# Patient Record
Sex: FEMALE | Race: WHITE | NOT HISPANIC OR LATINO | Employment: OTHER | URBAN - METROPOLITAN AREA
[De-identification: names, ages, dates, MRNs, and addresses within clinical notes are randomized per-mention and may not be internally consistent; named-entity substitution may affect disease eponyms.]

---

## 2017-02-22 ENCOUNTER — TRANSCRIBE ORDERS (OUTPATIENT)
Dept: ADMINISTRATIVE | Facility: HOSPITAL | Age: 62
End: 2017-02-22

## 2017-07-10 ENCOUNTER — GENERIC CONVERSION - ENCOUNTER (OUTPATIENT)
Dept: OTHER | Facility: OTHER | Age: 62
End: 2017-07-10

## 2017-07-10 ENCOUNTER — TRANSCRIBE ORDERS (OUTPATIENT)
Dept: SLEEP CENTER | Facility: CLINIC | Age: 62
End: 2017-07-10

## 2017-07-10 DIAGNOSIS — R06.83 SNORING: Primary | ICD-10-CM

## 2017-07-10 DIAGNOSIS — G47.33 OBSTRUCTIVE SLEEP APNEA (ADULT) (PEDIATRIC): Primary | ICD-10-CM

## 2017-07-12 ENCOUNTER — GENERIC CONVERSION - ENCOUNTER (OUTPATIENT)
Dept: OTHER | Facility: OTHER | Age: 62
End: 2017-07-12
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
## 2018-01-10 NOTE — PROGRESS NOTES
Message  clearance forms prepared and faxed to patient's psychiatrist       Active Problems    1  Acid reflux (530 81) (K21 9)   2  Acute Erosive Gastritis (535 40)   3  Acute otitis media, unspecified laterality   4  Acute sinusitis (461 9) (J01 90)   5  Allergic rhinitis (477 9) (J30 9)   6  Anisocoria (379 41) (H57 02)   7  Asthma (493 90) (J45 909)   8  Bipolar Disorder   9  Breast pain (611 71) (N64 4)   10  Cough (786 2) (R05)   11  Cyst of ovary, right (620 2) (N83 201)   12  Cystocele, midline (618 01) (N81 11)   13  Depression (311) (F32 9)   14  Diaper rash (691 0) (L22)   15  Encounter for screening mammogram for malignant neoplasm of breast (V76 12)    (Z12 31)   16  Esophagitis, reflux (530 11) (K21 0)   17  Essential hypertriglyceridemia (272 1) (E78 1)   18  Fatigue (780 79) (R53 83)   19  Headache (784 0) (R51)   20  Heart burn (787 1) (R12)   21  Hyperlipidemia (272 4) (E78 5)   22  Hyponatremia (276 1) (E87 1)   23  Hypothyroidism (244 9) (E03 9)   24  Lump or mass in breast (611 72) (N63)   25  Migraine headache (346 90) (G43 909)   26  Morbid obesity (278 01) (E66 01)   27  Narcolepsy (347 00) (G47 419)   28  Need for pneumococcal vaccination (V03 82) (Z23)   29  Need for prophylactic vaccination and inoculation against influenza (V04 81) (Z23)   30  Overactive bladder (596 51) (N32 81)   31  Pessary maintenance (V53 99) (Z46 89)   32  Post traumatic stress disorder (309 81) (F43 10)   33  Skin rash (782 1) (R21)   34  Tobacco use (305 1) (Z72 0)   35  Urge incontinence of urine (788 31) (N39 41)   36  Urinary tract infection (599 0) (N39 0)   37  Vaginal atrophy (627 3) (N95 2)   38  Vaginal Pain (625 8)   39  Vitamin D deficiency (268 9) (E55 9)   40  Well adult on routine health check (V70 0) (Z00 00)    Current Meds   1  Abilify 15 MG Oral Tablet (ARIPiprazole); TAKE 1 TABLET DAILY; Therapy: 04KBT1255 to (Susi Ojeda)  Requested for: 34FGP7380; Last   Rx:24Oct2013 Ordered   2  AeroChamber MV Miscellaneous; Use with ventolin; Therapy: 87CWT0147 to (Evaluate:82Hrp4672); Last Rx:27Pba8687 Ordered   3  Aspirin 81 MG CAPS; Therapy: (Recorded:67Nlr0016) to Recorded   4  BreatheRite Melba Spacer Adult Miscellaneous; Use as directed with the inhalers; Therapy: 14UOJ3052 to (Last Rx:72Gfq0544)  Requested for: 21Rts5943 Ordered   5  BuPROPion HCl ER (XL) 150 MG Oral Tablet Extended Release 24 Hour; Therapy: 26FXS7293 to Recorded   6  CarBAMazepine 200 MG Oral Tablet; Therapy: 32Shl6682 to (Last Rx:10Apr2011)  Requested for: 10Apr2011 Ordered   7  CloNIDine HCl - 0 1 MG Oral Tablet; Therapy: 99BPD5953 to Recorded   8  Fish Oil Triple Strength 1400 MG Oral Capsule; TAKE 2 CAPSULE Every twelve hours; Therapy: 76EMO9989 to (Evaluate:12Nov2014); Last Rx:17Nov2013 Ordered   9  Geodon 60 MG Oral Capsule (Ziprasidone HCl); Therapy: 06CHF5743 to (Last Rx:20Jan2012)  Requested for: 20Jan2012 Ordered   10  Levothyroxine Sodium 25 MCG Oral Tablet; take 1 tablet by mouth once daily; Therapy: 50UNI2870 to (Maryana Espinoza)  Requested for: 41QMH0294; Last    Rx:09Mar2015 Ordered   11  LORazepam 2 MG Oral Tablet; Therapy: 14EJW5760 to Recorded   12  Montelukast Sodium 10 MG Oral Tablet (Singulair); TAKE 1 TABLET AT BEDTIME; Therapy: 77MYJ3305 to (Maryana Espinoza)  Requested for: 34DVY8130; Last    Rx:26Mar2015; Status: ACTIVE - Renewal Denied Ordered   13  Omeprazole 20 MG Oral Capsule Delayed Release; TAKE 1 CAPSULE AT BEDTIME; Therapy: 62OWR9486 to (Evaluate:07Jun2015)  Requested for: 96HJI5616; Last    Rx:80Lle4928 Ordered   14  Oxybutynin Chloride 5 MG Oral Tablet; Therapy: (Recorded:47Qqn3956) to Recorded   15  Premarin 0 625 MG/GM Vaginal Cream; Apply pea sized amount M, W, F for 3 weeks and    then 2 times a week for the rest of the time; Therapy: 08Edy4828 to (Last Rx:28Aug2014) Ordered   16  TraZODone HCl - 150 MG Oral Tablet;     Therapy: 32XJM5242 to (Last Rx:03Xxx4905)  Requested for: 13Sok1097 Ordered   17  TraZODone HCl - 300 MG Oral Tablet; Therapy: 38RQD2517 to Recorded   18  Ventolin  (90 Base) MCG/ACT Inhalation Aerosol Solution; INHALE 2 PUFFS    EVERY 6 HOURS AS NEEDED; Therapy: 91FRO7128 to (Evaluate:25Mar2016)  Requested for: 95TXW4927; Last    Rx:26Mar2015 Ordered   19  Vitamin D 2000 UNIT Oral Capsule; TAKE 2 CAPSULE Daily; Therapy: 79FFH2438 to (Evaluate:80Szm9685)  Requested for: 33TUT6123; Last    Rx:12Nov2013 Ordered   20  Ziprasidone HCl - 80 MG Oral Capsule; Therapy: 98KTZ1282 to (Last Vickii Budds)  Requested for: 60Eih0561 Ordered    Allergies    1  Cymbalta CPEP   2  Lithium Carbonate CAPS    Signatures   Electronically signed by :  Kj Wright, LCSWMSWMSW,FLAVIO; Jul 12 2017 12:23PM EST                       (Author)

## 2018-01-13 VITALS
RESPIRATION RATE: 22 BRPM | TEMPERATURE: 98.4 F | HEIGHT: 63 IN | HEART RATE: 80 BPM | WEIGHT: 226 LBS | DIASTOLIC BLOOD PRESSURE: 64 MMHG | SYSTOLIC BLOOD PRESSURE: 122 MMHG | BODY MASS INDEX: 40.04 KG/M2

## 2018-01-13 VITALS — WEIGHT: 226 LBS | BODY MASS INDEX: 40.04 KG/M2 | HEIGHT: 63 IN

## 2018-01-13 NOTE — PROGRESS NOTES
History of Present Illness  Bariatric MNT [facility] Screening Preop [facility]:     She was not on time she was late by 30 minutes  the appointment lasted: 30 minutes  Her surgeon is Dr Darrian Santos  The patient was present at the session  The diagnosis/reason for the appointment is: She has Grade III Obesity with a BMI of 40 7  Diet Order: Nutritional Assesment for Bariatric Surgery  Her goal weight is 165#-174#  She has the following comorbidities: hypertension and Asthma, Hypothyroid, Heartburn, Reflux, Depression, Anxiety, PTSD, lb, hysty  Labs: Girma Richey She was reminded to have her labs drawn   She does not need to monitor her glucose  She does not have a meter to test her blood glucose  Exercise Frequency:  She does not exercise  Relationship to food: She is an emotional eater, eats when she is bored and eats large portions  She knows the difference between being comfortably full and uncomfortably full and knows the difference between being stuffed and comfortably full  She felt/thought they felt her best at 115-120# pounds she last weighed this 23years old  She completed a food journal She drinks 0 cups of water daily She drinks 10-20 caffienated beverages daily Her motivators are:pt wants to improve health and quality of life  Her obesity/being overweight is related to positive energy balance and is evidenced by: BMI=40 7, pt repots large portions and sedentary lifestyle  Knowledge Deficit Prior to Education  She is new to the diet  Barriers to education:  She has no barriers to education  Medical Nutrition Therapy Intervention: Individualized Meal Plan, Daily Food /Exercise Diary, Lifestyle /Behavior Modification Techniques, Basic Pathophysiology of Obesity, Checklist of the Overview of Lesson Plans and Surgical changes to Stomach/GI     Area's Reviewed: Post - surgery goal weight, Web forum, Lifestyle Misti Ledezma Modification Techniques, [employer] in [place] [employer] ( hand out for on-line & smart phone) and Pre- surgery goals /rules  Brief Review of Vitamins, diet progression for post-op and Pathophysiology of Obesity  Her comprehension of the presented material was fair  Her receptivity to the presented material was fair  Her motivation was fair  Provided: Nutrition Guidelines for Gastric Surgery, Bariatric Program Pre- Surgery Nutrition and Goals  Goals: Her set goal for physical activity is walk , for 30 minutes, 3-5 days a week  Review [employer] in [place]   Post Surgery: She will adhere to the diet progression: remain hydrated, consume adequate protein; and take vitamins as outlined in guidelines  Patient is a 63year old who is here for nutritional evaluation for weight loss surgery  I reviewed co-morbidities and medications with patient  She first recalls having problems with weight gain as a young adult  She has dieted in the past with variable success but would regain the weight back  For her personal pre-op goals she will: begin food journaling, measuring her food portions, eating three meals per day, and walking around her neighborhood 5 days per week  She plans to food journal to track her intake  Provided pt with information on [employer]  She was instructed on the importance of consistent vitamin and mineral intake after her surgery to prevent deficiencies  She currently takes 2000 IU vitamin D3 and an omega-3 supplement daily  Reviewed importance of support after surgery and discussed the [employer], facebook page, pep rally, and support group  Patient states adequate knowledge of nutrition, exercise, and behavior modification required for long term success  Pt  is recommended for surgery and is aware to practice lifestyle modification, complete all six lesson plans in bariatric manual, and complete two-week liver shrinking diet prior to surgery   Patient is aware that she has 3 months of a medically-supervised weight management program required by her insurance  Month 1 of 6 scheduled with TODD+GABRIELLE for Thursday, 8/10/17 at 11:30am    Recommendations: She was provided contact information for any questions  She is recommended for surgery  ~He/She needs additional education  She states adequate knowledge of nutrition, exercise, and behavior modification  She will attend a Team Meeting approximately 2-3 weeks prior to surgery  Active Problems    1  Acid reflux (530 81) (K21 9)   2  Acute Erosive Gastritis (535 40)   3  Acute otitis media, unspecified laterality   4  Acute sinusitis (461 9) (J01 90)   5  Allergic rhinitis (477 9) (J30 9)   6  Anisocoria (379 41) (H57 02)   7  Asthma (493 90) (J45 909)   8  Bipolar Disorder   9  Breast pain (611 71) (N64 4)   10  Cough (786 2) (R05)   11  Cyst of ovary, right (620 2) (N83 201)   12  Cystocele, midline (618 01) (N81 11)   13  Depression (311) (F32 9)   14  Diaper rash (691 0) (L22)   15  Encounter for screening mammogram for malignant neoplasm of breast (V76 12)    (Z12 31)   16  Esophagitis, reflux (530 11) (K21 0)   17  Essential hypertriglyceridemia (272 1) (E78 1)   18  Fatigue (780 79) (R53 83)   19  Headache (784 0) (R51)   20  Heart burn (787 1) (R12)   21  Hyperlipidemia (272 4) (E78 5)   22  Hyponatremia (276 1) (E87 1)   23  Hypothyroidism (244 9) (E03 9)   24  Lump or mass in breast (611 72) (N63)   25  Migraine headache (346 90) (G43 909)   26  Morbid obesity (278 01) (E66 01)   27  Narcolepsy (347 00) (G47 419)   28  Need for pneumococcal vaccination (V03 82) (Z23)   29  Need for prophylactic vaccination and inoculation against influenza (V04 81) (Z23)   30  Overactive bladder (596 51) (N32 81)   31  Pessary maintenance (V53 99) (Z46 89)   32  Post traumatic stress disorder (309 81) (F43 10)   33  Skin rash (782 1) (R21)   34  Tobacco use (305 1) (Z72 0)   35  Urge incontinence of urine (788 31) (N39 41)   36  Urinary tract infection (599 0) (N39 0)   37   Vaginal atrophy (627 3) (N95 2) 38  Vaginal Pain (625 8)   39  Vitamin D deficiency (268 9) (E55 9)   40  Well adult on routine health check (V70 0) (Z00 00)    Past Medical History    1  Bipolar Disorder   2  Cough (786 2) (R05)   3  History of transient cerebral ischemia (V12 54) (Z86 73)   4  History of upper respiratory infection (V12 09) (Z87 09)   5  Need for pneumococcal vaccination (V03 82) (Z23)   6  Need for prophylactic vaccination and inoculation against influenza (V04 81) (Z23)    Surgical History    1  History of Cholecystectomy   2  History of Hysterectomy   3  History of Tubal Ligation    Family History  Mother    1  Family history of cerebrovascular accident (CVA) (V17 1) (Z82 3)   2  Family history of diabetes mellitus (V18 0) (Z83 3)   3  Family history of hypertension (V17 49) (Z82 49)  Father    4  Family history of    5  Family history of malignant neoplasm (V16 9) (Z80 9)    Social History    · Does not use illicit drugs (I24 83) (Z78 9)   · Recently quit tobacco use   · Social alcohol use (Z78 9)   · Tobacco use (305 1) (Z72 0)    Current Meds   1  Abilify 15 MG Oral Tablet; TAKE 1 TABLET DAILY; Therapy: 16GON4639 to (Mirlande Christie)  Requested for: 29MLY6040; Last   Rx:31Yfq3537 Ordered   2  AeroChamber MV Miscellaneous; Use with ventolin; Therapy: 17BMJ4184 to (Evaluate:52Emq9123); Last Rx:64Vlp2467 Ordered   3  Aspirin 81 MG CAPS; Therapy: (Recorded:53Yey7264) to Recorded   4  BreatheRite Melba Spacer Adult Miscellaneous; Use as directed with the inhalers; Therapy: 98THY0550 to (Last Rx:57Idq7225)  Requested for: 70Wmh6454 Ordered   5  BuPROPion HCl ER (XL) 150 MG Oral Tablet Extended Release 24 Hour; Therapy: 27POM0866 to Recorded   6  CarBAMazepine 200 MG Oral Tablet; Therapy: 03Ngk6236 to (Last Rx:26Nao3844)  Requested for: 24Lnz8288 Ordered   7  CloNIDine HCl - 0 1 MG Oral Tablet; Therapy: 57NMQ4155 to Recorded   8   Fish Oil Triple Strength 1400 MG Oral Capsule; TAKE 2 CAPSULE Every twelve hours; Therapy: 49GQW8584 to (Evaluate:12Nov2014); Last Rx:17Nov2013 Ordered   9  Geodon 60 MG Oral Capsule; Therapy: 95NKT8741 to (Last Rx:20Jan2012)  Requested for: 20Jan2012 Ordered   10  Levothyroxine Sodium 25 MCG Oral Tablet; take 1 tablet by mouth once daily; Therapy: 07MZH8272 to (Reji Mechellenancy)  Requested for: 48QIV4397; Last    Rx:09Mar2015 Ordered   11  LORazepam 2 MG Oral Tablet; Therapy: 66MFT0461 to Recorded   12  Montelukast Sodium 10 MG Oral Tablet; TAKE 1 TABLET AT BEDTIME; Therapy: 92ERR1942 to (Lito Holcomb)  Requested for: 12XXC1959; Last    Rx:26Mar2015; Status: ACTIVE - Renewal Denied Ordered   13  Omeprazole 20 MG Oral Capsule Delayed Release; TAKE 1 CAPSULE AT BEDTIME; Therapy: 90LWS8161 to (Evaluate:07Jun2015)  Requested for: 36RRQ7520; Last    Rx:95Ovi1434 Ordered   14  Oxybutynin Chloride 5 MG Oral Tablet; Therapy: (Recorded:01Fxq2159) to Recorded   15  Premarin 0 625 MG/GM Vaginal Cream; Apply pea sized amount M, W, F for 3 weeks and    then 2 times a week for the rest of the time; Therapy: 35Mxo5109 to (Last Rx:29Bpw6566) Ordered   16  TraZODone HCl - 150 MG Oral Tablet; Therapy: 76GNK3098 to (Last Rx:20Mef3479)  Requested for: 39Kpj3047 Ordered   17  TraZODone HCl - 300 MG Oral Tablet; Therapy: 18IGP5421 to Recorded   18  Ventolin  (90 Base) MCG/ACT Inhalation Aerosol Solution; INHALE 2 PUFFS    EVERY 6 HOURS AS NEEDED; Therapy: 94WYA5660 to (Evaluate:25Mar2016)  Requested for: 21AYW2915; Last    Rx:26Mar2015 Ordered   19  Vitamin D 2000 UNIT Oral Capsule; TAKE 2 CAPSULE Daily; Therapy: 90XIP7013 to (Evaluate:09Xic5431)  Requested for: 95SPL6148; Last    Rx:12Nov2013 Ordered   20  Ziprasidone HCl - 80 MG Oral Capsule; Therapy: 75XVH9570 to (Last Rozetta Plain)  Requested for: 38Mbr8903 Ordered    Allergies    1  Cymbalta CPEP   2   Lithium Carbonate CAPS    Vitals  Signs   Recorded: 70LEZ4896 03:59PM   Height: 5 ft 2 5 in  Weight: 226 lb   BMI Calculated: 40 68  BSA Calculated: 2 03    Future Appointments    Date/Time Provider Specialty Site   10/20/2017 01:00 PM SHIMA Wilson  Bariatric Medicine [facility]     Signatures   Electronically signed by : PETRA June RD; Jul 10 2017  3:59PM EST                       (Author)    Electronically signed by :  SHIMA Rowe ; Jul 11 2017  9:16AM EST

## 2020-10-19 ENCOUNTER — HOSPITAL ENCOUNTER (INPATIENT)
Facility: HOSPITAL | Age: 65
LOS: 10 days | Discharge: HOME | DRG: 885 | End: 2020-10-30
Attending: EMERGENCY MEDICINE | Admitting: PSYCHIATRY & NEUROLOGY
Payer: COMMERCIAL

## 2020-10-19 DIAGNOSIS — E03.9 ACQUIRED HYPOTHYROIDISM: ICD-10-CM

## 2020-10-19 DIAGNOSIS — F32.A DEPRESSION: Primary | ICD-10-CM

## 2020-10-19 DIAGNOSIS — Z79.899 ENCOUNTER FOR MEDICATION REVIEW: ICD-10-CM

## 2020-10-19 DIAGNOSIS — F31.9 BIPOLAR 1 DISORDER (HCC): ICD-10-CM

## 2020-10-19 DIAGNOSIS — Z79.899 MEDICATION MANAGEMENT: ICD-10-CM

## 2020-10-19 LAB
ALBUMIN SERPL BCP-MCNC: 3.7 G/DL (ref 3–5.2)
ALP SERPL-CCNC: 61 U/L (ref 43–122)
ALT SERPL W P-5'-P-CCNC: 11 U/L (ref 9–52)
AMPHETAMINES SERPL QL SCN: NEGATIVE
ANION GAP SERPL CALCULATED.3IONS-SCNC: 7 MMOL/L (ref 5–14)
AST SERPL W P-5'-P-CCNC: 18 U/L (ref 14–36)
BACTERIA UR QL AUTO: ABNORMAL /HPF
BARBITURATES UR QL: NEGATIVE
BASOPHILS # BLD AUTO: 0 THOUSANDS/ΜL (ref 0–0.1)
BASOPHILS NFR BLD AUTO: 1 % (ref 0–1)
BENZODIAZ UR QL: NEGATIVE
BILIRUB SERPL-MCNC: 0.4 MG/DL
BILIRUB UR QL STRIP: NEGATIVE
BUN SERPL-MCNC: 18 MG/DL (ref 5–25)
CALCIUM SERPL-MCNC: 9.1 MG/DL (ref 8.4–10.2)
CHLORIDE SERPL-SCNC: 98 MMOL/L (ref 97–108)
CLARITY UR: ABNORMAL
CO2 SERPL-SCNC: 27 MMOL/L (ref 22–30)
COCAINE UR QL: NEGATIVE
COLOR UR: YELLOW
CREAT SERPL-MCNC: 0.67 MG/DL (ref 0.6–1.2)
EOSINOPHIL # BLD AUTO: 0.1 THOUSAND/ΜL (ref 0–0.4)
EOSINOPHIL NFR BLD AUTO: 1 % (ref 0–6)
ERYTHROCYTE [DISTWIDTH] IN BLOOD BY AUTOMATED COUNT: 18.6 %
ETHANOL EXG-MCNC: 0 MG/DL
GFR SERPL CREATININE-BSD FRML MDRD: 94 ML/MIN/1.73SQ M
GLUCOSE SERPL-MCNC: 97 MG/DL (ref 70–99)
GLUCOSE UR STRIP-MCNC: NEGATIVE MG/DL
HCT VFR BLD AUTO: 36.5 % (ref 36–46)
HGB BLD-MCNC: 12.4 G/DL (ref 12–16)
HGB UR QL STRIP.AUTO: 10
KETONES UR STRIP-MCNC: NEGATIVE MG/DL
LEUKOCYTE ESTERASE UR QL STRIP: 500
LYMPHOCYTES # BLD AUTO: 1.6 THOUSANDS/ΜL (ref 0.5–4)
LYMPHOCYTES NFR BLD AUTO: 25 % (ref 25–45)
MCH RBC QN AUTO: 27.6 PG (ref 26–34)
MCHC RBC AUTO-ENTMCNC: 34 G/DL (ref 31–36)
MCV RBC AUTO: 81 FL (ref 80–100)
METHADONE UR QL: NEGATIVE
MONOCYTES # BLD AUTO: 0.6 THOUSAND/ΜL (ref 0.2–0.9)
MONOCYTES NFR BLD AUTO: 9 % (ref 1–10)
NEUTROPHILS # BLD AUTO: 4.2 THOUSANDS/ΜL (ref 1.8–7.8)
NEUTS SEG NFR BLD AUTO: 64 % (ref 45–65)
NITRITE UR QL STRIP: NEGATIVE
NON-SQ EPI CELLS URNS QL MICRO: ABNORMAL /HPF
OPIATES UR QL SCN: NEGATIVE
OXYCODONE+OXYMORPHONE UR QL SCN: NEGATIVE
PCP UR QL: NEGATIVE
PH UR STRIP.AUTO: 7 [PH]
PLATELET # BLD AUTO: 336 THOUSANDS/UL (ref 150–450)
PMV BLD AUTO: 7.9 FL (ref 8.9–12.7)
POTASSIUM SERPL-SCNC: 3.7 MMOL/L (ref 3.6–5)
PROT SERPL-MCNC: 6.8 G/DL (ref 5.9–8.4)
PROT UR STRIP-MCNC: NEGATIVE MG/DL
RBC # BLD AUTO: 4.5 MILLION/UL (ref 4–5.2)
RBC #/AREA URNS AUTO: ABNORMAL /HPF
SARS-COV-2 RNA RESP QL NAA+PROBE: NEGATIVE
SODIUM SERPL-SCNC: 132 MMOL/L (ref 137–147)
SP GR UR STRIP.AUTO: 1.01 (ref 1–1.04)
THC UR QL: NEGATIVE
TSH SERPL DL<=0.05 MIU/L-ACNC: 0.72 UIU/ML (ref 0.47–4.68)
UROBILINOGEN UA: NEGATIVE MG/DL
WBC # BLD AUTO: 6.5 THOUSAND/UL (ref 4.5–11)
WBC #/AREA URNS AUTO: ABNORMAL /HPF

## 2020-10-19 PROCEDURE — 82075 ASSAY OF BREATH ETHANOL: CPT | Performed by: EMERGENCY MEDICINE

## 2020-10-19 PROCEDURE — 80053 COMPREHEN METABOLIC PANEL: CPT | Performed by: EMERGENCY MEDICINE

## 2020-10-19 PROCEDURE — 36415 COLL VENOUS BLD VENIPUNCTURE: CPT | Performed by: EMERGENCY MEDICINE

## 2020-10-19 PROCEDURE — 99285 EMERGENCY DEPT VISIT HI MDM: CPT

## 2020-10-19 PROCEDURE — 80307 DRUG TEST PRSMV CHEM ANLYZR: CPT | Performed by: EMERGENCY MEDICINE

## 2020-10-19 PROCEDURE — 81001 URINALYSIS AUTO W/SCOPE: CPT | Performed by: EMERGENCY MEDICINE

## 2020-10-19 PROCEDURE — 93005 ELECTROCARDIOGRAM TRACING: CPT

## 2020-10-19 PROCEDURE — 85025 COMPLETE CBC W/AUTO DIFF WBC: CPT | Performed by: EMERGENCY MEDICINE

## 2020-10-19 PROCEDURE — 84443 ASSAY THYROID STIM HORMONE: CPT | Performed by: EMERGENCY MEDICINE

## 2020-10-19 PROCEDURE — 99284 EMERGENCY DEPT VISIT MOD MDM: CPT | Performed by: EMERGENCY MEDICINE

## 2020-10-19 PROCEDURE — 87635 SARS-COV-2 COVID-19 AMP PRB: CPT | Performed by: EMERGENCY MEDICINE

## 2020-10-19 RX ORDER — NITROFURANTOIN 25; 75 MG/1; MG/1
100 CAPSULE ORAL ONCE
Status: COMPLETED | OUTPATIENT
Start: 2020-10-19 | End: 2020-10-19

## 2020-10-19 RX ORDER — DIPHENHYDRAMINE HCL 25 MG
25 TABLET ORAL ONCE
Status: COMPLETED | OUTPATIENT
Start: 2020-10-19 | End: 2020-10-19

## 2020-10-19 RX ADMIN — DIPHENHYDRAMINE HCL 25 MG: 25 TABLET ORAL at 22:50

## 2020-10-19 RX ADMIN — NITROFURANTOIN (MONOHYDRATE/MACROCRYSTALS) 100 MG: 75; 25 CAPSULE ORAL at 22:50

## 2020-10-20 LAB
ATRIAL RATE: 66 BPM
P AXIS: -17 DEGREES
PR INTERVAL: 158 MS
QRS AXIS: 249 DEGREES
QRSD INTERVAL: 92 MS
QT INTERVAL: 416 MS
QTC INTERVAL: 436 MS
T WAVE AXIS: 59 DEGREES
VENTRICULAR RATE: 66 BPM

## 2020-10-20 PROCEDURE — 90682 RIV4 VACC RECOMBINANT DNA IM: CPT | Performed by: PSYCHIATRY & NEUROLOGY

## 2020-10-20 PROCEDURE — 93010 ELECTROCARDIOGRAM REPORT: CPT | Performed by: INTERNAL MEDICINE

## 2020-10-20 PROCEDURE — G0008 ADMIN INFLUENZA VIRUS VAC: HCPCS | Performed by: PSYCHIATRY & NEUROLOGY

## 2020-10-20 PROCEDURE — NC001 PR NO CHARGE: Performed by: EMERGENCY MEDICINE

## 2020-10-20 RX ORDER — TRAZODONE HYDROCHLORIDE 50 MG/1
25 TABLET ORAL
Status: DISCONTINUED | OUTPATIENT
Start: 2020-10-20 | End: 2020-10-22

## 2020-10-20 RX ORDER — LORAZEPAM 0.5 MG/1
1 TABLET ORAL ONCE
Status: COMPLETED | OUTPATIENT
Start: 2020-10-20 | End: 2020-10-20

## 2020-10-20 RX ORDER — OLANZAPINE 5 MG/1
5 TABLET ORAL EVERY 8 HOURS PRN
Status: DISCONTINUED | OUTPATIENT
Start: 2020-10-20 | End: 2020-10-30 | Stop reason: HOSPADM

## 2020-10-20 RX ORDER — LORAZEPAM 0.5 MG/1
0.5 TABLET ORAL EVERY 4 HOURS PRN
Status: DISCONTINUED | OUTPATIENT
Start: 2020-10-20 | End: 2020-10-30 | Stop reason: HOSPADM

## 2020-10-20 RX ORDER — BENZTROPINE MESYLATE 1 MG/1
1 TABLET ORAL
COMMUNITY
End: 2020-10-30 | Stop reason: HOSPADM

## 2020-10-20 RX ORDER — HALOPERIDOL 5 MG
5 TABLET ORAL
COMMUNITY
End: 2020-10-30 | Stop reason: HOSPADM

## 2020-10-20 RX ORDER — ACETAMINOPHEN 325 MG/1
650 TABLET ORAL EVERY 6 HOURS PRN
Status: DISCONTINUED | OUTPATIENT
Start: 2020-10-20 | End: 2020-10-30 | Stop reason: HOSPADM

## 2020-10-20 RX ORDER — LOSARTAN POTASSIUM 50 MG/1
50 TABLET ORAL DAILY
COMMUNITY
End: 2020-10-30 | Stop reason: HOSPADM

## 2020-10-20 RX ORDER — AMLODIPINE BESYLATE 5 MG/1
5 TABLET ORAL DAILY
COMMUNITY
End: 2020-10-30 | Stop reason: HOSPADM

## 2020-10-20 RX ORDER — ACETAMINOPHEN 325 MG/1
650 TABLET ORAL EVERY 4 HOURS PRN
Status: DISCONTINUED | OUTPATIENT
Start: 2020-10-20 | End: 2020-10-30 | Stop reason: HOSPADM

## 2020-10-20 RX ORDER — NITROFURANTOIN 25; 75 MG/1; MG/1
100 CAPSULE ORAL 2 TIMES DAILY WITH MEALS
Status: COMPLETED | OUTPATIENT
Start: 2020-10-20 | End: 2020-10-24

## 2020-10-20 RX ORDER — RISPERIDONE 1 MG/1
1 TABLET, ORALLY DISINTEGRATING ORAL EVERY 8 HOURS PRN
Status: DISCONTINUED | OUTPATIENT
Start: 2020-10-20 | End: 2020-10-30 | Stop reason: HOSPADM

## 2020-10-20 RX ORDER — MONTELUKAST SODIUM 10 MG/1
10 TABLET ORAL DAILY
Status: ON HOLD | COMMUNITY
End: 2020-10-29 | Stop reason: SDUPTHER

## 2020-10-20 RX ORDER — HALOPERIDOL 1 MG/1
2 TABLET ORAL EVERY 8 HOURS PRN
Status: DISCONTINUED | OUTPATIENT
Start: 2020-10-20 | End: 2020-10-30 | Stop reason: HOSPADM

## 2020-10-20 RX ORDER — DIVALPROEX SODIUM 500 MG/1
500 TABLET, EXTENDED RELEASE ORAL EVERY EVENING
COMMUNITY
End: 2020-10-30 | Stop reason: HOSPADM

## 2020-10-20 RX ORDER — POLYETHYLENE GLYCOL 3350 17 G/17G
17 POWDER, FOR SOLUTION ORAL DAILY PRN
Status: DISCONTINUED | OUTPATIENT
Start: 2020-10-20 | End: 2020-10-30 | Stop reason: HOSPADM

## 2020-10-20 RX ORDER — OLANZAPINE 10 MG/1
5 INJECTION, POWDER, LYOPHILIZED, FOR SOLUTION INTRAMUSCULAR EVERY 8 HOURS PRN
Status: DISCONTINUED | OUTPATIENT
Start: 2020-10-20 | End: 2020-10-30 | Stop reason: HOSPADM

## 2020-10-20 RX ORDER — LEVOTHYROXINE SODIUM 0.07 MG/1
75 TABLET ORAL DAILY
Status: ON HOLD | COMMUNITY
End: 2020-10-29 | Stop reason: SDUPTHER

## 2020-10-20 RX ORDER — NICOTINE 21 MG/24HR
1 PATCH, TRANSDERMAL 24 HOURS TRANSDERMAL DAILY
Status: DISCONTINUED | OUTPATIENT
Start: 2020-10-20 | End: 2020-10-30 | Stop reason: HOSPADM

## 2020-10-20 RX ORDER — HYDROCHLOROTHIAZIDE 25 MG/1
25 TABLET ORAL DAILY
COMMUNITY
End: 2020-10-30 | Stop reason: HOSPADM

## 2020-10-20 RX ADMIN — NITROFURANTOIN (MONOHYDRATE/MACROCRYSTALS) 100 MG: 75; 25 CAPSULE ORAL at 17:58

## 2020-10-20 RX ADMIN — Medication 1 PATCH: at 14:12

## 2020-10-20 RX ADMIN — TRAZODONE HYDROCHLORIDE 25 MG: 50 TABLET ORAL at 21:23

## 2020-10-20 RX ADMIN — LORAZEPAM 1 MG: 0.5 TABLET ORAL at 12:11

## 2020-10-20 RX ADMIN — INFLUENZA A VIRUS A/HAWAII/70/2019 (H1N1) RECOMBINANT HEMAGGLUTININ ANTIGEN, INFLUENZA A VIRUS A/MINNESOTA/41/2019 (H3N2) RECOMBINANT HEMAGGLUTININ ANTIGEN, INFLUENZA B VIRUS B/WASHINGTON/02/2019 RECOMBINANT HEMAGGLUTININ ANTIGEN, AND INFLUENZA B VIRUS B/PHUKET/3073/2013 RECOMBINANT HEMAGGLUTININ ANTIGEN 0.5 ML: 45; 45; 45; 45 INJECTION INTRAMUSCULAR at 18:11

## 2020-10-20 RX ADMIN — NITROFURANTOIN (MONOHYDRATE/MACROCRYSTALS) 100 MG: 75; 25 CAPSULE ORAL at 09:01

## 2020-10-21 PROBLEM — F31.9 BIPOLAR 1 DISORDER (HCC): Status: ACTIVE | Noted: 2020-10-21

## 2020-10-21 PROBLEM — I10 ESSENTIAL HYPERTENSION: Status: ACTIVE | Noted: 2020-10-21

## 2020-10-21 PROBLEM — E03.9 ACQUIRED HYPOTHYROIDISM: Status: ACTIVE | Noted: 2020-10-21

## 2020-10-21 PROBLEM — Z00.8 MEDICAL CLEARANCE FOR PSYCHIATRIC ADMISSION: Status: ACTIVE | Noted: 2020-10-21

## 2020-10-21 PROCEDURE — 99253 IP/OBS CNSLTJ NEW/EST LOW 45: CPT | Performed by: PHYSICIAN ASSISTANT

## 2020-10-21 RX ORDER — NYSTATIN 100000 [USP'U]/G
1 POWDER TOPICAL 2 TIMES DAILY
Status: DISCONTINUED | OUTPATIENT
Start: 2020-10-21 | End: 2020-10-30 | Stop reason: HOSPADM

## 2020-10-21 RX ORDER — MONTELUKAST SODIUM 10 MG/1
10 TABLET ORAL DAILY
Status: DISCONTINUED | OUTPATIENT
Start: 2020-10-21 | End: 2020-10-30 | Stop reason: HOSPADM

## 2020-10-21 RX ORDER — ARIPIPRAZOLE 5 MG/1
5 TABLET ORAL DAILY
Status: DISCONTINUED | OUTPATIENT
Start: 2020-10-21 | End: 2020-10-22

## 2020-10-21 RX ORDER — DIVALPROEX SODIUM 500 MG/1
1000 TABLET, EXTENDED RELEASE ORAL
Status: DISCONTINUED | OUTPATIENT
Start: 2020-10-21 | End: 2020-10-30 | Stop reason: HOSPADM

## 2020-10-21 RX ORDER — AMLODIPINE BESYLATE 5 MG/1
5 TABLET ORAL DAILY
Status: DISCONTINUED | OUTPATIENT
Start: 2020-10-21 | End: 2020-10-23

## 2020-10-21 RX ORDER — LOSARTAN POTASSIUM 50 MG/1
50 TABLET ORAL DAILY
Status: DISCONTINUED | OUTPATIENT
Start: 2020-10-21 | End: 2020-10-22

## 2020-10-21 RX ORDER — LEVOTHYROXINE SODIUM 0.07 MG/1
75 TABLET ORAL DAILY
Status: DISCONTINUED | OUTPATIENT
Start: 2020-10-22 | End: 2020-10-30 | Stop reason: HOSPADM

## 2020-10-21 RX ORDER — HYDROCHLOROTHIAZIDE 25 MG/1
25 TABLET ORAL DAILY
Status: DISCONTINUED | OUTPATIENT
Start: 2020-10-21 | End: 2020-10-22

## 2020-10-21 RX ADMIN — NITROFURANTOIN (MONOHYDRATE/MACROCRYSTALS) 100 MG: 75; 25 CAPSULE ORAL at 08:42

## 2020-10-21 RX ADMIN — Medication 1 PATCH: at 08:43

## 2020-10-21 RX ADMIN — LORAZEPAM 0.5 MG: 0.5 TABLET ORAL at 23:14

## 2020-10-21 RX ADMIN — MONTELUKAST 10 MG: 10 TABLET, FILM COATED ORAL at 14:45

## 2020-10-21 RX ADMIN — DIVALPROEX SODIUM 1000 MG: 500 TABLET, FILM COATED, EXTENDED RELEASE ORAL at 21:11

## 2020-10-21 RX ADMIN — ARIPIPRAZOLE 5 MG: 5 TABLET ORAL at 09:25

## 2020-10-21 RX ADMIN — NYSTATIN 1 APPLICATION: 100000 POWDER TOPICAL at 17:40

## 2020-10-21 RX ADMIN — POLYETHYLENE GLYCOL (3350) 17 G: 17 POWDER, FOR SOLUTION ORAL at 13:11

## 2020-10-21 RX ADMIN — NITROFURANTOIN (MONOHYDRATE/MACROCRYSTALS) 100 MG: 75; 25 CAPSULE ORAL at 17:00

## 2020-10-21 RX ADMIN — TRAZODONE HYDROCHLORIDE 25 MG: 50 TABLET ORAL at 21:11

## 2020-10-22 RX ORDER — HYDROCHLOROTHIAZIDE 12.5 MG/1
12.5 TABLET ORAL DAILY
Status: DISCONTINUED | OUTPATIENT
Start: 2020-10-23 | End: 2020-10-23

## 2020-10-22 RX ORDER — LOSARTAN POTASSIUM 25 MG/1
25 TABLET ORAL DAILY
Status: DISCONTINUED | OUTPATIENT
Start: 2020-10-23 | End: 2020-10-23

## 2020-10-22 RX ORDER — QUETIAPINE FUMARATE 50 MG/1
50 TABLET, FILM COATED ORAL
Status: DISCONTINUED | OUTPATIENT
Start: 2020-10-22 | End: 2020-10-25

## 2020-10-22 RX ADMIN — ACETAMINOPHEN 650 MG: 325 TABLET ORAL at 19:59

## 2020-10-22 RX ADMIN — NITROFURANTOIN (MONOHYDRATE/MACROCRYSTALS) 100 MG: 75; 25 CAPSULE ORAL at 17:00

## 2020-10-22 RX ADMIN — MONTELUKAST 10 MG: 10 TABLET, FILM COATED ORAL at 08:42

## 2020-10-22 RX ADMIN — DIVALPROEX SODIUM 1000 MG: 500 TABLET, FILM COATED, EXTENDED RELEASE ORAL at 21:32

## 2020-10-22 RX ADMIN — QUETIAPINE FUMARATE 50 MG: 50 TABLET ORAL at 21:32

## 2020-10-22 RX ADMIN — POLYETHYLENE GLYCOL (3350) 17 G: 17 POWDER, FOR SOLUTION ORAL at 12:46

## 2020-10-22 RX ADMIN — Medication 1 PATCH: at 08:42

## 2020-10-22 RX ADMIN — NYSTATIN 1 APPLICATION: 100000 POWDER TOPICAL at 08:44

## 2020-10-22 RX ADMIN — NYSTATIN 1 APPLICATION: 100000 POWDER TOPICAL at 17:37

## 2020-10-22 RX ADMIN — ARIPIPRAZOLE 5 MG: 5 TABLET ORAL at 08:42

## 2020-10-22 RX ADMIN — NITROFURANTOIN (MONOHYDRATE/MACROCRYSTALS) 100 MG: 75; 25 CAPSULE ORAL at 08:42

## 2020-10-22 RX ADMIN — LEVOTHYROXINE SODIUM 75 MCG: 75 TABLET ORAL at 05:50

## 2020-10-23 RX ORDER — BISACODYL 10 MG
10 SUPPOSITORY, RECTAL RECTAL DAILY PRN
Status: DISCONTINUED | OUTPATIENT
Start: 2020-10-23 | End: 2020-10-24

## 2020-10-23 RX ADMIN — LEVOTHYROXINE SODIUM 75 MCG: 75 TABLET ORAL at 06:03

## 2020-10-23 RX ADMIN — Medication 1 PATCH: at 08:34

## 2020-10-23 RX ADMIN — BISACODYL 10 MG: 10 SUPPOSITORY RECTAL at 09:45

## 2020-10-23 RX ADMIN — QUETIAPINE FUMARATE 50 MG: 50 TABLET ORAL at 21:54

## 2020-10-23 RX ADMIN — NITROFURANTOIN (MONOHYDRATE/MACROCRYSTALS) 100 MG: 75; 25 CAPSULE ORAL at 17:07

## 2020-10-23 RX ADMIN — NITROFURANTOIN (MONOHYDRATE/MACROCRYSTALS) 100 MG: 75; 25 CAPSULE ORAL at 08:32

## 2020-10-23 RX ADMIN — MONTELUKAST 10 MG: 10 TABLET, FILM COATED ORAL at 08:32

## 2020-10-23 RX ADMIN — NYSTATIN 1 APPLICATION: 100000 POWDER TOPICAL at 17:07

## 2020-10-23 RX ADMIN — NYSTATIN 1 APPLICATION: 100000 POWDER TOPICAL at 08:35

## 2020-10-23 RX ADMIN — POLYETHYLENE GLYCOL (3350) 17 G: 17 POWDER, FOR SOLUTION ORAL at 15:57

## 2020-10-23 RX ADMIN — DIVALPROEX SODIUM 1000 MG: 500 TABLET, FILM COATED, EXTENDED RELEASE ORAL at 21:54

## 2020-10-24 LAB — VALPROATE SERPL-MCNC: 54.6 UG/ML (ref 50–120)

## 2020-10-24 PROCEDURE — 80164 ASSAY DIPROPYLACETIC ACD TOT: CPT | Performed by: PSYCHIATRY & NEUROLOGY

## 2020-10-24 RX ORDER — BISACODYL 10 MG
10 SUPPOSITORY, RECTAL RECTAL DAILY PRN
Status: DISCONTINUED | OUTPATIENT
Start: 2020-10-24 | End: 2020-10-30 | Stop reason: HOSPADM

## 2020-10-24 RX ADMIN — QUETIAPINE FUMARATE 50 MG: 50 TABLET ORAL at 21:41

## 2020-10-24 RX ADMIN — ACETAMINOPHEN 650 MG: 325 TABLET ORAL at 14:24

## 2020-10-24 RX ADMIN — MONTELUKAST 10 MG: 10 TABLET, FILM COATED ORAL at 08:28

## 2020-10-24 RX ADMIN — NITROFURANTOIN (MONOHYDRATE/MACROCRYSTALS) 100 MG: 75; 25 CAPSULE ORAL at 17:12

## 2020-10-24 RX ADMIN — LEVOTHYROXINE SODIUM 75 MCG: 75 TABLET ORAL at 08:28

## 2020-10-24 RX ADMIN — NYSTATIN 1 APPLICATION: 100000 POWDER TOPICAL at 17:12

## 2020-10-24 RX ADMIN — NITROFURANTOIN (MONOHYDRATE/MACROCRYSTALS) 100 MG: 75; 25 CAPSULE ORAL at 08:28

## 2020-10-24 RX ADMIN — NYSTATIN 1 APPLICATION: 100000 POWDER TOPICAL at 08:28

## 2020-10-24 RX ADMIN — Medication 1 PATCH: at 08:31

## 2020-10-24 RX ADMIN — DIVALPROEX SODIUM 1000 MG: 500 TABLET, FILM COATED, EXTENDED RELEASE ORAL at 21:40

## 2020-10-25 RX ORDER — QUETIAPINE FUMARATE 100 MG/1
100 TABLET, FILM COATED ORAL
Status: DISCONTINUED | OUTPATIENT
Start: 2020-10-25 | End: 2020-10-26

## 2020-10-25 RX ADMIN — LORAZEPAM 0.5 MG: 0.5 TABLET ORAL at 01:22

## 2020-10-25 RX ADMIN — Medication 1 PATCH: at 08:37

## 2020-10-25 RX ADMIN — NYSTATIN 1 APPLICATION: 100000 POWDER TOPICAL at 17:27

## 2020-10-25 RX ADMIN — NYSTATIN 1 APPLICATION: 100000 POWDER TOPICAL at 08:37

## 2020-10-25 RX ADMIN — MONTELUKAST 10 MG: 10 TABLET, FILM COATED ORAL at 08:36

## 2020-10-25 RX ADMIN — QUETIAPINE FUMARATE 100 MG: 100 TABLET ORAL at 21:30

## 2020-10-25 RX ADMIN — DIVALPROEX SODIUM 1000 MG: 500 TABLET, FILM COATED, EXTENDED RELEASE ORAL at 21:30

## 2020-10-25 RX ADMIN — LEVOTHYROXINE SODIUM 75 MCG: 75 TABLET ORAL at 06:13

## 2020-10-26 RX ADMIN — NYSTATIN 1 APPLICATION: 100000 POWDER TOPICAL at 08:24

## 2020-10-26 RX ADMIN — NYSTATIN 1 APPLICATION: 100000 POWDER TOPICAL at 17:14

## 2020-10-26 RX ADMIN — MONTELUKAST 10 MG: 10 TABLET, FILM COATED ORAL at 08:24

## 2020-10-26 RX ADMIN — LEVOTHYROXINE SODIUM 75 MCG: 75 TABLET ORAL at 06:24

## 2020-10-26 RX ADMIN — Medication 1 PATCH: at 08:25

## 2020-10-26 RX ADMIN — QUETIAPINE FUMARATE 150 MG: 100 TABLET ORAL at 22:04

## 2020-10-26 RX ADMIN — DIVALPROEX SODIUM 1000 MG: 500 TABLET, FILM COATED, EXTENDED RELEASE ORAL at 22:04

## 2020-10-26 RX ADMIN — LORAZEPAM 0.5 MG: 0.5 TABLET ORAL at 00:00

## 2020-10-27 RX ADMIN — QUETIAPINE FUMARATE 150 MG: 100 TABLET ORAL at 21:29

## 2020-10-27 RX ADMIN — LEVOTHYROXINE SODIUM 75 MCG: 75 TABLET ORAL at 06:20

## 2020-10-27 RX ADMIN — MONTELUKAST 10 MG: 10 TABLET, FILM COATED ORAL at 08:07

## 2020-10-27 RX ADMIN — NYSTATIN 1 APPLICATION: 100000 POWDER TOPICAL at 17:08

## 2020-10-27 RX ADMIN — Medication 1 PATCH: at 08:08

## 2020-10-27 RX ADMIN — DIVALPROEX SODIUM 1000 MG: 500 TABLET, FILM COATED, EXTENDED RELEASE ORAL at 21:29

## 2020-10-27 RX ADMIN — NYSTATIN 1 APPLICATION: 100000 POWDER TOPICAL at 08:10

## 2020-10-28 RX ADMIN — NYSTATIN 1 APPLICATION: 100000 POWDER TOPICAL at 08:33

## 2020-10-28 RX ADMIN — MONTELUKAST 10 MG: 10 TABLET, FILM COATED ORAL at 08:28

## 2020-10-28 RX ADMIN — QUETIAPINE FUMARATE 150 MG: 100 TABLET ORAL at 21:45

## 2020-10-28 RX ADMIN — Medication 1 PATCH: at 08:29

## 2020-10-28 RX ADMIN — POLYETHYLENE GLYCOL (3350) 17 G: 17 POWDER, FOR SOLUTION ORAL at 21:45

## 2020-10-28 RX ADMIN — DIVALPROEX SODIUM 1000 MG: 500 TABLET, FILM COATED, EXTENDED RELEASE ORAL at 21:45

## 2020-10-28 RX ADMIN — NYSTATIN 1 APPLICATION: 100000 POWDER TOPICAL at 21:45

## 2020-10-28 RX ADMIN — POLYETHYLENE GLYCOL (3350) 17 G: 17 POWDER, FOR SOLUTION ORAL at 12:06

## 2020-10-28 RX ADMIN — LEVOTHYROXINE SODIUM 75 MCG: 75 TABLET ORAL at 06:02

## 2020-10-29 RX ORDER — DIVALPROEX SODIUM 500 MG/1
1000 TABLET, EXTENDED RELEASE ORAL
Qty: 60 TABLET | Refills: 0 | Status: ON HOLD
Start: 2020-10-29 | End: 2021-05-05 | Stop reason: SDUPTHER

## 2020-10-29 RX ORDER — QUETIAPINE FUMARATE 50 MG/1
150 TABLET, FILM COATED ORAL
Qty: 90 TABLET | Refills: 0
Start: 2020-10-29 | End: 2021-05-05 | Stop reason: HOSPADM

## 2020-10-29 RX ORDER — LEVOTHYROXINE SODIUM 0.07 MG/1
75 TABLET ORAL DAILY
Qty: 30 TABLET | Refills: 0
Start: 2020-10-29 | End: 2021-06-01 | Stop reason: ALTCHOICE

## 2020-10-29 RX ORDER — MONTELUKAST SODIUM 10 MG/1
10 TABLET ORAL DAILY
Qty: 30 TABLET | Refills: 0
Start: 2020-10-29 | End: 2021-06-01 | Stop reason: ALTCHOICE

## 2020-10-29 RX ORDER — NYSTATIN 100000 [USP'U]/G
1 POWDER TOPICAL 2 TIMES DAILY
Qty: 60 G | Refills: 0
Start: 2020-10-29 | End: 2021-06-01 | Stop reason: ALTCHOICE

## 2020-10-29 RX ADMIN — QUETIAPINE FUMARATE 150 MG: 100 TABLET ORAL at 21:30

## 2020-10-29 RX ADMIN — DIVALPROEX SODIUM 1000 MG: 500 TABLET, FILM COATED, EXTENDED RELEASE ORAL at 21:30

## 2020-10-29 RX ADMIN — LEVOTHYROXINE SODIUM 75 MCG: 75 TABLET ORAL at 06:04

## 2020-10-29 RX ADMIN — Medication 1 PATCH: at 08:25

## 2020-10-29 RX ADMIN — NYSTATIN 1 APPLICATION: 100000 POWDER TOPICAL at 08:27

## 2020-10-29 RX ADMIN — ACETAMINOPHEN 650 MG: 325 TABLET ORAL at 05:06

## 2020-10-29 RX ADMIN — MONTELUKAST 10 MG: 10 TABLET, FILM COATED ORAL at 08:23

## 2020-10-29 RX ADMIN — NYSTATIN 1 APPLICATION: 100000 POWDER TOPICAL at 17:03

## 2020-10-30 VITALS
OXYGEN SATURATION: 98 % | BODY MASS INDEX: 31.89 KG/M2 | WEIGHT: 173.28 LBS | HEIGHT: 62 IN | DIASTOLIC BLOOD PRESSURE: 53 MMHG | HEART RATE: 68 BPM | SYSTOLIC BLOOD PRESSURE: 94 MMHG | RESPIRATION RATE: 16 BRPM | TEMPERATURE: 96.9 F

## 2020-10-30 PROCEDURE — 99238 HOSP IP/OBS DSCHRG MGMT 30/<: CPT | Performed by: PHYSICIAN ASSISTANT

## 2020-10-30 RX ADMIN — Medication 1 PATCH: at 08:38

## 2020-10-30 RX ADMIN — MONTELUKAST 10 MG: 10 TABLET, FILM COATED ORAL at 08:37

## 2020-10-30 RX ADMIN — NYSTATIN 1 APPLICATION: 100000 POWDER TOPICAL at 09:47

## 2020-10-30 RX ADMIN — LEVOTHYROXINE SODIUM 75 MCG: 75 TABLET ORAL at 06:14

## 2020-10-30 RX ADMIN — LORAZEPAM 0.5 MG: 0.5 TABLET ORAL at 00:20

## 2021-04-28 ENCOUNTER — HOSPITAL ENCOUNTER (INPATIENT)
Facility: HOSPITAL | Age: 66
LOS: 7 days | Discharge: HOME | DRG: 885 | End: 2021-05-05
Attending: EMERGENCY MEDICINE | Admitting: PSYCHIATRY & NEUROLOGY
Payer: COMMERCIAL

## 2021-04-28 DIAGNOSIS — Z00.8 ENCOUNTER FOR PSYCHOLOGICAL EVALUATION: ICD-10-CM

## 2021-04-28 DIAGNOSIS — F31.9 BIPOLAR 1 DISORDER (HCC): ICD-10-CM

## 2021-04-28 DIAGNOSIS — F39 SEVERE MOOD DISORDER WITH PSYCHOTIC FEATURES (HCC): Primary | ICD-10-CM

## 2021-04-28 LAB
ALBUMIN SERPL BCP-MCNC: 4.4 G/DL (ref 3–5.2)
ALP SERPL-CCNC: 126 U/L (ref 43–122)
ALT SERPL W P-5'-P-CCNC: 20 U/L
AMPHETAMINES SERPL QL SCN: NEGATIVE
ANION GAP SERPL CALCULATED.3IONS-SCNC: 12 MMOL/L (ref 5–14)
AST SERPL W P-5'-P-CCNC: 26 U/L (ref 14–36)
BACTERIA UR QL AUTO: ABNORMAL /HPF
BARBITURATES UR QL: NEGATIVE
BASOPHILS # BLD AUTO: 0 THOUSANDS/ΜL (ref 0–0.1)
BASOPHILS NFR BLD AUTO: 0 % (ref 0–1)
BENZODIAZ UR QL: NEGATIVE
BILIRUB SERPL-MCNC: 0.26 MG/DL
BILIRUB UR QL STRIP: NEGATIVE
BUN SERPL-MCNC: 17 MG/DL (ref 5–25)
CALCIUM SERPL-MCNC: 9.9 MG/DL (ref 8.4–10.2)
CHLORIDE SERPL-SCNC: 101 MMOL/L (ref 97–108)
CLARITY UR: CLEAR
CO2 SERPL-SCNC: 24 MMOL/L (ref 22–30)
COCAINE UR QL: NEGATIVE
COLOR UR: ABNORMAL
CREAT SERPL-MCNC: 0.54 MG/DL (ref 0.6–1.2)
EOSINOPHIL # BLD AUTO: 0.1 THOUSAND/ΜL (ref 0–0.4)
EOSINOPHIL NFR BLD AUTO: 2 % (ref 0–6)
ERYTHROCYTE [DISTWIDTH] IN BLOOD BY AUTOMATED COUNT: 14.7 %
ETHANOL SERPL-MCNC: <10 MG/DL (ref 0–10)
GFR SERPL CREATININE-BSD FRML MDRD: 101 ML/MIN/1.73SQ M
GLUCOSE SERPL-MCNC: 92 MG/DL (ref 70–99)
GLUCOSE UR STRIP-MCNC: NEGATIVE MG/DL
HCT VFR BLD AUTO: 40.6 % (ref 36–46)
HGB BLD-MCNC: 13 G/DL (ref 12–16)
HGB UR QL STRIP.AUTO: NEGATIVE
KETONES UR STRIP-MCNC: NEGATIVE MG/DL
LEUKOCYTE ESTERASE UR QL STRIP: 25
LYMPHOCYTES # BLD AUTO: 1.5 THOUSANDS/ΜL (ref 0.5–4)
LYMPHOCYTES NFR BLD AUTO: 23 % (ref 25–45)
MCH RBC QN AUTO: 26.9 PG (ref 26–34)
MCHC RBC AUTO-ENTMCNC: 32.1 G/DL (ref 31–36)
MCV RBC AUTO: 84 FL (ref 80–100)
METHADONE UR QL: NEGATIVE
MONOCYTES # BLD AUTO: 0.6 THOUSAND/ΜL (ref 0.2–0.9)
MONOCYTES NFR BLD AUTO: 10 % (ref 1–10)
NEUTROPHILS # BLD AUTO: 4.2 THOUSANDS/ΜL (ref 1.8–7.8)
NEUTS SEG NFR BLD AUTO: 65 % (ref 45–65)
NITRITE UR QL STRIP: NEGATIVE
NON-SQ EPI CELLS URNS QL MICRO: ABNORMAL /HPF
OPIATES UR QL SCN: NEGATIVE
OXYCODONE+OXYMORPHONE UR QL SCN: NEGATIVE
PCP UR QL: NEGATIVE
PH UR STRIP.AUTO: 6 [PH]
PLATELET # BLD AUTO: 365 THOUSANDS/UL (ref 150–450)
PMV BLD AUTO: 8.2 FL (ref 8.9–12.7)
POTASSIUM SERPL-SCNC: 4.6 MMOL/L (ref 3.6–5)
PROT SERPL-MCNC: 8.2 G/DL (ref 5.9–8.4)
PROT UR STRIP-MCNC: NEGATIVE MG/DL
RBC # BLD AUTO: 4.85 MILLION/UL (ref 4–5.2)
RBC #/AREA URNS AUTO: ABNORMAL /HPF
SARS-COV-2 RNA RESP QL NAA+PROBE: NEGATIVE
SODIUM SERPL-SCNC: 137 MMOL/L (ref 137–147)
SP GR UR STRIP.AUTO: 1.01 (ref 1–1.04)
THC UR QL: NEGATIVE
TSH SERPL DL<=0.05 MIU/L-ACNC: 4.76 UIU/ML (ref 0.47–4.68)
UROBILINOGEN UA: NEGATIVE MG/DL
VALPROATE SERPL-MCNC: <10 UG/ML (ref 50–120)
WBC # BLD AUTO: 6.4 THOUSAND/UL (ref 4.5–11)
WBC #/AREA URNS AUTO: ABNORMAL /HPF

## 2021-04-28 PROCEDURE — 81001 URINALYSIS AUTO W/SCOPE: CPT | Performed by: EMERGENCY MEDICINE

## 2021-04-28 PROCEDURE — 82077 ASSAY SPEC XCP UR&BREATH IA: CPT | Performed by: EMERGENCY MEDICINE

## 2021-04-28 PROCEDURE — 80053 COMPREHEN METABOLIC PANEL: CPT | Performed by: EMERGENCY MEDICINE

## 2021-04-28 PROCEDURE — 85025 COMPLETE CBC W/AUTO DIFF WBC: CPT | Performed by: EMERGENCY MEDICINE

## 2021-04-28 PROCEDURE — 36415 COLL VENOUS BLD VENIPUNCTURE: CPT | Performed by: EMERGENCY MEDICINE

## 2021-04-28 PROCEDURE — 99285 EMERGENCY DEPT VISIT HI MDM: CPT

## 2021-04-28 PROCEDURE — 80307 DRUG TEST PRSMV CHEM ANLYZR: CPT | Performed by: EMERGENCY MEDICINE

## 2021-04-28 PROCEDURE — 99203 OFFICE O/P NEW LOW 30 MIN: CPT | Performed by: INTERNAL MEDICINE

## 2021-04-28 PROCEDURE — 81003 URINALYSIS AUTO W/O SCOPE: CPT | Performed by: EMERGENCY MEDICINE

## 2021-04-28 PROCEDURE — 84439 ASSAY OF FREE THYROXINE: CPT | Performed by: PHYSICIAN ASSISTANT

## 2021-04-28 PROCEDURE — U0005 INFEC AGEN DETEC AMPLI PROBE: HCPCS | Performed by: EMERGENCY MEDICINE

## 2021-04-28 PROCEDURE — U0003 INFECTIOUS AGENT DETECTION BY NUCLEIC ACID (DNA OR RNA); SEVERE ACUTE RESPIRATORY SYNDROME CORONAVIRUS 2 (SARS-COV-2) (CORONAVIRUS DISEASE [COVID-19]), AMPLIFIED PROBE TECHNIQUE, MAKING USE OF HIGH THROUGHPUT TECHNOLOGIES AS DESCRIBED BY CMS-2020-01-R: HCPCS | Performed by: EMERGENCY MEDICINE

## 2021-04-28 PROCEDURE — 93005 ELECTROCARDIOGRAM TRACING: CPT

## 2021-04-28 PROCEDURE — 84443 ASSAY THYROID STIM HORMONE: CPT | Performed by: EMERGENCY MEDICINE

## 2021-04-28 PROCEDURE — 99285 EMERGENCY DEPT VISIT HI MDM: CPT | Performed by: EMERGENCY MEDICINE

## 2021-04-28 PROCEDURE — 80164 ASSAY DIPROPYLACETIC ACD TOT: CPT | Performed by: EMERGENCY MEDICINE

## 2021-04-28 PROCEDURE — 99252 IP/OBS CONSLTJ NEW/EST SF 35: CPT | Performed by: PHYSICIAN ASSISTANT

## 2021-04-28 PROCEDURE — NC001 PR NO CHARGE: Performed by: STUDENT IN AN ORGANIZED HEALTH CARE EDUCATION/TRAINING PROGRAM

## 2021-04-28 RX ORDER — OLANZAPINE 2.5 MG/1
2.5 TABLET ORAL
Status: DISCONTINUED | OUTPATIENT
Start: 2021-04-28 | End: 2021-05-05 | Stop reason: HOSPADM

## 2021-04-28 RX ORDER — LEVOTHYROXINE SODIUM 0.07 MG/1
75 TABLET ORAL
Status: DISCONTINUED | OUTPATIENT
Start: 2021-04-29 | End: 2021-05-05 | Stop reason: HOSPADM

## 2021-04-28 RX ORDER — MINERAL OIL AND PETROLATUM 150; 830 MG/G; MG/G
1 OINTMENT OPHTHALMIC
Status: DISCONTINUED | OUTPATIENT
Start: 2021-04-28 | End: 2021-05-05 | Stop reason: HOSPADM

## 2021-04-28 RX ORDER — OLANZAPINE 5 MG/1
5 TABLET ORAL
Status: DISCONTINUED | OUTPATIENT
Start: 2021-04-28 | End: 2021-05-05 | Stop reason: HOSPADM

## 2021-04-28 RX ORDER — MONTELUKAST SODIUM 10 MG/1
10 TABLET ORAL DAILY
Status: DISCONTINUED | OUTPATIENT
Start: 2021-04-29 | End: 2021-05-05 | Stop reason: HOSPADM

## 2021-04-28 RX ORDER — TRAZODONE HYDROCHLORIDE 50 MG/1
50 TABLET ORAL
Status: DISCONTINUED | OUTPATIENT
Start: 2021-04-28 | End: 2021-05-05 | Stop reason: HOSPADM

## 2021-04-28 RX ORDER — ACETAMINOPHEN 325 MG/1
975 TABLET ORAL EVERY 6 HOURS PRN
Status: DISCONTINUED | OUTPATIENT
Start: 2021-04-28 | End: 2021-05-05 | Stop reason: HOSPADM

## 2021-04-28 RX ORDER — BENZTROPINE MESYLATE 0.5 MG/1
0.5 TABLET ORAL
Status: DISCONTINUED | OUTPATIENT
Start: 2021-04-28 | End: 2021-05-05 | Stop reason: HOSPADM

## 2021-04-28 RX ORDER — ACETAMINOPHEN 325 MG/1
650 TABLET ORAL EVERY 4 HOURS PRN
Status: DISCONTINUED | OUTPATIENT
Start: 2021-04-28 | End: 2021-05-05 | Stop reason: HOSPADM

## 2021-04-28 RX ORDER — MAGNESIUM HYDROXIDE/ALUMINUM HYDROXICE/SIMETHICONE 120; 1200; 1200 MG/30ML; MG/30ML; MG/30ML
30 SUSPENSION ORAL EVERY 4 HOURS PRN
Status: DISCONTINUED | OUTPATIENT
Start: 2021-04-28 | End: 2021-05-05 | Stop reason: HOSPADM

## 2021-04-28 RX ORDER — LORAZEPAM 1 MG/1
1 TABLET ORAL ONCE
Status: COMPLETED | OUTPATIENT
Start: 2021-04-28 | End: 2021-04-28

## 2021-04-28 RX ORDER — HYDROXYZINE 50 MG/1
50 TABLET, FILM COATED ORAL
Status: DISCONTINUED | OUTPATIENT
Start: 2021-04-28 | End: 2021-05-05 | Stop reason: HOSPADM

## 2021-04-28 RX ORDER — AMOXICILLIN 250 MG
1 CAPSULE ORAL DAILY PRN
Status: DISCONTINUED | OUTPATIENT
Start: 2021-04-28 | End: 2021-05-04

## 2021-04-28 RX ORDER — HYDROXYZINE HYDROCHLORIDE 25 MG/1
25 TABLET, FILM COATED ORAL
Status: DISCONTINUED | OUTPATIENT
Start: 2021-04-28 | End: 2021-05-05 | Stop reason: HOSPADM

## 2021-04-28 RX ORDER — BENZTROPINE MESYLATE 1 MG/ML
1 INJECTION INTRAMUSCULAR; INTRAVENOUS
Status: DISCONTINUED | OUTPATIENT
Start: 2021-04-28 | End: 2021-05-05 | Stop reason: HOSPADM

## 2021-04-28 RX ORDER — OLANZAPINE 10 MG/1
5 INJECTION, POWDER, LYOPHILIZED, FOR SOLUTION INTRAMUSCULAR
Status: DISCONTINUED | OUTPATIENT
Start: 2021-04-28 | End: 2021-05-05 | Stop reason: HOSPADM

## 2021-04-28 RX ADMIN — LORAZEPAM 1 MG: 1 TABLET ORAL at 17:32

## 2021-04-28 NOTE — ED NOTES
[place] PROMISe indicates: Active  Recipient #[id number]  Behavioral health managed by [facility]  Phone number: [phone]  Primary payor is Chava Morrissey

## 2021-04-28 NOTE — ED NOTES
[facility] med rec - pt does not recall what meds she is supposed to be taking and has not taken them for months, states she no longer has a doctor   However, she previously took medicine for hypertension and PTSD, she cannot identify the names     Frankey Limes, RN  04/28/21 4352

## 2021-04-28 NOTE — ED PROVIDER NOTES
History  Chief Complaint   Patient presents with    Psychiatric Evaluation     hx bipolar, stopped taking meds  feels like she is having a "mental breakdown" tonight  feels like people are spraying stuff under her door     This is a 53-year-old female presenting to the emergency department for evaluation of nervous breakdown   She states she has a history of bipolar disorder and some taking her medications 3 days ago  She states she has been feeling this way for about a week, feels like people are spraying something under the door to her apartment  She also feels like people heart poking her with needles, but is not specific about who  She does not believe these are hallucinations, and believes they are real   She denies hearing any voices  She does not want to kill herself or kill anybody else  She does admit to having suicidal ideations in the past with no plan, but not presently  She denies any drug or alcohol use  History provided by:  Patient   used: No    Psychiatric Evaluation  Presenting symptoms: depression and hallucinations    Associated symptoms: anxiety        Prior to Admission Medications   Prescriptions Last Dose Informant Patient Reported? Taking?    QUEtiapine (SEROquel) 50 mg tablet   No No   Sig: Take 3 tablets (150 mg total) by mouth daily at bedtime   divalproex sodium (DEPAKOTE ER) 500 mg 24 hr tablet Unknown at Unknown time  No No   Sig: Take 2 tablets (1,000 mg total) by mouth daily at bedtime   levothyroxine 75 mcg tablet   No No   Sig: Take 1 tablet (75 mcg total) by mouth daily   montelukast (SINGULAIR) 10 mg tablet   No No   Sig: Take 1 tablet (10 mg total) by mouth daily   nystatin (MYCOSTATIN) powder   No No   Sig: Apply 1 application topically 2 (two) times a day      Facility-Administered Medications: None       Past Medical History:   Diagnosis Date    Anxiety     [facility]    Depression     Disease of thyroid gland  Hypertension     Panic attack     Psychiatric illness     Psychosis [facility])        Past Surgical History:   Procedure Laterality Date    HYSTERECTOMY      TUBAL LIGATION         History reviewed  No pertinent family history  I have reviewed and agree with the history as documented  E-Cigarette/Vaping     E-Cigarette/Vaping Substances     Social History     Tobacco Use    Smoking status: Current Every Day Smoker     Packs/day: 1 00    Smokeless tobacco: Never Used   Substance Use Topics    Alcohol use: Never     Frequency: Never    Drug use: Never       Review of Systems   Psychiatric/Behavioral: Positive for dysphoric mood and hallucinations  The patient is nervous/anxious  All other systems reviewed and are negative  Physical Exam  Physical Exam  Vitals signs and nursing note reviewed  Constitutional:       Appearance: Normal appearance  She is well-developed  She is obese  HENT:      Head: Normocephalic and atraumatic  Right Ear: External ear normal       Left Ear: External ear normal       Nose: Nose normal       Mouth/Throat:      Mouth: Mucous membranes are moist       Pharynx: Oropharynx is clear  Eyes:      Extraocular Movements: Extraocular movements intact  Conjunctiva/sclera: Conjunctivae normal       Pupils: Pupils are equal, round, and reactive to light  Neck:      Musculoskeletal: Normal range of motion and neck supple  Cardiovascular:      Rate and Rhythm: Normal rate and regular rhythm  Heart sounds: Normal heart sounds  Pulmonary:      Effort: Pulmonary effort is normal       Breath sounds: Normal breath sounds  Abdominal:      General: Abdomen is flat  Bowel sounds are normal       Palpations: Abdomen is soft  Musculoskeletal: Normal range of motion  Skin:     General: Skin is warm and dry  Capillary Refill: Capillary refill takes less than 2 seconds  Neurological:      General: No focal deficit present        Mental Status: She is alert and oriented to person, place, and time  Mental status is at baseline  Psychiatric:         Mood and Affect: Mood normal          Behavior: Behavior normal       Comments: Animated behavior, + paranoia, + hallucinations, tangential thought process  Poor judgment and insight into condition  Vital Signs  ED Triage Vitals [04/28/21 1636]   Temperature Pulse Respirations Blood Pressure SpO2   98 6 °F (37 °C) 86 18 (!) 186/91 98 %      Temp Source Heart Rate Source Patient Position - Orthostatic VS BP Location FiO2 (%)   Tympanic Monitor Sitting Left arm --      Pain Score       --           Vitals:    04/28/21 1636 04/28/21 2018   BP: (!) 186/91 117/58   Pulse: 86 69   Patient Position - Orthostatic VS: Sitting Lying         Visual Acuity      ED Medications  Medications   LORazepam (ATIVAN) tablet 1 mg (1 mg Oral Given 4/28/21 1732)       Diagnostic Studies  Results Reviewed     Procedure Component Value Units Date/Time    Rapid drug screen, urine [[id number]]  (Normal) Collected: 04/28/21 1859    Lab Status: Final result Specimen: Urine Updated: 04/28/21 1910     Amph/Meth UR Negative     Barbiturate Ur Negative     Benzodiazepine Urine Negative     Cocaine Urine Negative     Methadone Urine Negative     Opiate Urine Negative     PCP Ur Negative     THC Urine Negative     Oxycodone Urine Negative    Narrative:      FOR MEDICAL PURPOSES ONLY  IF CONFIRMATION NEEDED PLEASE CONTACT THE LAB WITHIN 5 DAYS      Drug Screen Cutoff Levels:  AMPHETAMINE/METHAMPHETAMINES  1000 ng/mL  BARBITURATES     200 ng/mL  BENZODIAZEPINES     200 ng/mL  COCAINE      300 ng/mL  METHADONE      300 ng/mL  OPIATES      300 ng/mL  PHENCYCLIDINE     25 ng/mL  THC       50 ng/mL  OXYCODONE      100 ng/mL    Urine Microscopic [[id number]]  (Abnormal) Collected: 04/28/21 1849    Lab Status: Final result Specimen: Urine Updated: 04/28/21 1858     RBC, UA 0-1 /hpf      WBC, UA 1-2 /hpf      Epithelial Cells Occasional /hpf      Bacteria, UA Moderate /hpf     UA w Reflex to Microscopic w Reflex to Culture [[id number]]  (Abnormal) Collected: 04/28/21 1849    Lab Status: Final result Specimen: Urine Updated: 04/28/21 1850     Color, UA Straw     Clarity, UA Clear     Specific Gravity, UA 1 015     pH, UA 6 0     Leukocytes, UA 25 0     Nitrite, UA Negative     Protein, UA Negative mg/dl      Glucose, UA Negative mg/dl      Ketones, UA Negative mg/dl      Bilirubin, UA Negative     Blood, UA Negative     UROBILINOGEN UA Negative mg/dL     Novel Coronavirus (Covid-19),PCR [facility] - 2 hour stat [[id number]]  (Normal) Collected: 04/28/21 1732    Lab Status: Final result Specimen: Nares from Nose Updated: 04/28/21 1843     SARS-CoV-2 Negative    Narrative: The specimen collection materials, transport medium, and/or testing methodology utilized in the production of these test results have been proven to be reliable in a limited validation with an abbreviated program under the Emergency Utilization Authorization provided by the FDA  Testing reported as "Presumptive positive" will be confirmed with secondary testing to ensure result accuracy  Clinical caution and judgement should be used with the interpretation of these results with consideration of the clinical impression and other laboratory testing  Testing reported as "Positive" or "Negative" has been proven to be accurate according to standard laboratory validation requirements  All testing is performed with control materials showing appropriate reactivity at standard intervals  TSH [[id number]]  (Abnormal) Collected: 04/28/21 1709    Lab Status: Final result Specimen: Blood from Arm, Left Updated: 04/28/21 1816     TSH 3RD GENERATON 4 760 uIU/mL     Narrative:      Patients undergoing fluorescein dye angiography may retain small amounts of fluorescein in the body for 48-72 hours post procedure  Samples containing fluorescein can produce falsely depressed TSH values   If the patient had this procedure,a specimen should be resubmitted post fluorescein clearance        Valproic acid level, total [[id number]]  (Abnormal) Collected: 04/28/21 1709    Lab Status: Final result Specimen: Blood from Arm, Left Updated: 04/28/21 1748     Valproic Acid, Total <10 0 ug/mL     Ethanol [[id number]]  (Normal) Collected: 04/28/21 1708    Lab Status: Final result Specimen: Blood from Arm, Left Updated: 04/28/21 1747     Ethanol Lvl <10 mg/dL     Comprehensive metabolic panel [[id number]]  (Abnormal) Collected: 04/28/21 1709    Lab Status: Final result Specimen: Blood from Arm, Left Updated: 04/28/21 1747     Sodium 137 mmol/L      Potassium 4 6 mmol/L      Chloride 101 mmol/L      CO2 24 mmol/L      ANION GAP 12 mmol/L      BUN 17 mg/dL      Creatinine 0 54 mg/dL      Glucose 92 mg/dL      Calcium 9 9 mg/dL      AST 26 U/L      ALT 20 U/L      Alkaline Phosphatase 126 U/L      Total Protein 8 2 g/dL      Albumin 4 4 g/dL      Total Bilirubin 0 26 mg/dL      eGFR 101 ml/min/1 73sq m     Narrative:      [place] guidelines for Chronic Kidney Disease (CKD):     Stage 1 with normal or high GFR (GFR > 90 mL/min/1 73 square meters)    Stage 2 Mild CKD (GFR = 60-89 mL/min/1 73 square meters)    Stage 3A Moderate CKD (GFR = 45-59 mL/min/1 73 square meters)    Stage 3B Moderate CKD (GFR = 30-44 mL/min/1 73 square meters)    Stage 4 Severe CKD (GFR = 15-29 mL/min/1 73 square meters)    Stage 5 End Stage CKD (GFR <15 mL/min/1 73 square meters)  Note: GFR calculation is accurate only with a steady state creatinine    CBC and differential [[id number]]  (Abnormal) Collected: 04/28/21 1708    Lab Status: Final result Specimen: Blood from Arm, Left Updated: 04/28/21 1733     WBC 6 40 Thousand/uL      RBC 4 85 Million/uL      Hemoglobin 13 0 g/dL      Hematocrit 40 6 %      MCV 84 fL      MCH 26 9 pg      MCHC 32 1 g/dL      RDW 14 7 %      MPV 8 2 fL      Platelets 770 Thousands/uL      Neutrophils Relative 65 %      Lymphocytes Relative 23 %      Monocytes Relative 10 %      Eosinophils Relative 2 %      Basophils Relative 0 %      Neutrophils Absolute 4 20 Thousands/µL      Lymphocytes Absolute 1 50 Thousands/µL      Monocytes Absolute 0 60 Thousand/µL      Eosinophils Absolute 0 10 Thousand/µL      Basophils Absolute 0 00 Thousands/µL                  No orders to display              Procedures  Procedures         ED Course  ED Course as of Apr 28 2157 Wed Apr 28, 2021   1753 Pt's daughter called, [facility] [phone]  States she's concerned about her mother and thinks she needs to be admitted psychiatrically due to her behavior lately - hallucinating, lives alone, and is concerned about her ability to care for herself  1845 Signed out to Dr Huang Host  SBIRT 20yo+      Most Recent Value   SBIRT (24 yo +)   In order to provide better care to our patients, we are screening all of our patients for alcohol and drug use  Would it be okay to ask you these screening questions?   Unable to answer at this time Filed at: 04/28/2021 1652                    [facility]  Number of Diagnoses or Management Options  Encounter for psychological evaluation:      Amount and/or Complexity of Data Reviewed  Clinical lab tests: ordered and reviewed    Risk of Complications, Morbidity, and/or Mortality  Presenting problems: moderate  Diagnostic procedures: moderate  Management options: moderate    Patient Progress  Patient progress: stable      Disposition  Final diagnoses:   Encounter for psychological evaluation     Time reflects when diagnosis was documented in both MDM as applicable and the Disposition within this note     Time User Action Codes Description Comment    4/28/2021  8:04 PM Rose Munguia Add [Z00 8] Encounter for psychological evaluation     4/28/2021  9:13 PM Saad Amado Add [F31 9] Bipolar 1 disorder [facility])       ED Disposition     ED Disposition Condition Date/Time Comment    Transfer to Behavioral Health  Wed Apr 28, 2021  8:04 PM  Area should be transferred out to [facility] and has been medically cleared  MD Documentation      Most Recent Value   Sending MD Dr Gabby Rodriguez      RN Documentation      Most Recent Value   Report Given to  Em Ferreira RN      Follow-up Information    None         Patient's Medications   Discharge Prescriptions    No medications on file     No discharge procedures on file      PDMP Review     None          ED Provider  Electronically Signed by           Endy Duke DO  04/28/21 2931

## 2021-04-29 PROBLEM — J45.20 MILD INTERMITTENT ASTHMA: Status: ACTIVE | Noted: 2021-04-29

## 2021-04-29 PROBLEM — F31.5 BIPOLAR I DISORDER, CURRENT OR MOST RECENT EPISODE DEPRESSED, WITH PSYCHOTIC FEATURES (HCC): Status: ACTIVE | Noted: 2020-10-21

## 2021-04-29 PROBLEM — F39 SEVERE MOOD DISORDER WITH PSYCHOTIC FEATURES (HCC): Status: ACTIVE | Noted: 2020-10-21

## 2021-04-29 LAB
ATRIAL RATE: 69 BPM
P AXIS: 25 DEGREES
PR INTERVAL: 170 MS
QRS AXIS: 88 DEGREES
QRSD INTERVAL: 98 MS
QT INTERVAL: 410 MS
QTC INTERVAL: 439 MS
T WAVE AXIS: 82 DEGREES
T4 FREE SERPL-MCNC: 1.15 NG/DL (ref 0.76–1.46)
VENTRICULAR RATE: 69 BPM

## 2021-04-29 PROCEDURE — 93010 ELECTROCARDIOGRAM REPORT: CPT | Performed by: INTERNAL MEDICINE

## 2021-04-29 PROCEDURE — 99222 1ST HOSP IP/OBS MODERATE 55: CPT | Performed by: PSYCHIATRY & NEUROLOGY

## 2021-04-29 RX ORDER — DIVALPROEX SODIUM 500 MG/1
1000 TABLET, EXTENDED RELEASE ORAL
Status: DISCONTINUED | OUTPATIENT
Start: 2021-04-29 | End: 2021-05-05 | Stop reason: HOSPADM

## 2021-04-29 RX ORDER — ALBUTEROL SULFATE 90 UG/1
2 AEROSOL, METERED RESPIRATORY (INHALATION) EVERY 4 HOURS PRN
Status: DISCONTINUED | OUTPATIENT
Start: 2021-04-29 | End: 2021-05-05 | Stop reason: HOSPADM

## 2021-04-29 RX ADMIN — MONTELUKAST 10 MG: 10 TABLET, FILM COATED ORAL at 08:10

## 2021-04-29 RX ADMIN — DIVALPROEX SODIUM 1000 MG: 500 TABLET, EXTENDED RELEASE ORAL at 21:16

## 2021-04-29 RX ADMIN — QUETIAPINE FUMARATE 150 MG: 100 TABLET ORAL at 21:16

## 2021-04-29 RX ADMIN — LEVOTHYROXINE SODIUM 75 MCG: 75 TABLET ORAL at 05:52

## 2021-04-29 NOTE — DISCHARGE INSTR - APPOINTMENTS
Antwan Lewis RN, our [employer], will be calling you after your discharge, on the phone number that you provided  She will be available as an additional support, if needed  If you wish to speak with her, you may contact Chitra Cintron at [phone]

## 2021-04-29 NOTE — NURSING NOTE
Patient is a 63year old female who admitted to 10 T from [facility] ED due to worsening paranoia and decreased sleep  Patient was anxious and irritable  During the admission assessment, she continues to believe that people are spraying stuff under her door and people are breaking into her apartment  Patient reported poor intake  Patient continues to have visual hallucinations, but denied  visual hallucinations  Denied SI,HI, patient is able to make her needs known  There are some redness under breasts and abdominal fold area

## 2021-04-29 NOTE — ASSESSMENT & PLAN NOTE
Patient seen and examined today, medically clear for admission to [facility]  Currently there are no acute medical needs; consult if acute medical needs arise

## 2021-04-29 NOTE — PROGRESS NOTES
Completed COB with Laurie Zepeda from [facility]  UR to contact upon discharge to complete COB

## 2021-04-29 NOTE — ED NOTES
Ryan Medicare form completed and faxed to [facility] and [facility]

## 2021-04-29 NOTE — PLAN OF CARE
Problem: DECISION MAKING  Goal: Pt/Family able to effectively weigh alternatives and participate in decision making related to treatment and care  Description: INTERVENTIONS:  - Identify decision maker  - Determine when there are differences among patient's view, family's view, and healthcare provider's view of patient condition and care goals  - Facilitate patient/family articulation of goals for care  - Help patient/family identify pros/cons of alternative solutions  - Provide information as requested by patient/family  - Respect patient/family rights related to privacy and sharing information   - Serve as a liaison between patient, family and health care team  - Initiate consults as appropriate (Ethics Team, Palliative Care, Family Care Conference, etc )  Outcome: Progressing     Problem: BEHAVIOR  Goal: Pt/Family maintain appropriate behavior and adhere to behavioral management agreement, if implemented  Description: INTERVENTIONS:  - Assess the family dynamic   - Encourage verbalization of thoughts and concerns in a socially appropriate manner  - Assess patient/family's coping skills and non-compliant behavior (including use of illegal substances)  - Utilize positive, consistent limit setting strategies supporting safety of patient, staff and others  - Initiate consult with Case Management, Spiritual Care or other ancillary services as appropriate  - If a patient's/visitor's behavior jeopardizes the safety of the patient, staff, or others, refer to organization procedure     - Notify Security of behavior or suspected illegal substances which indicate the need for search of the patient and/or belongings  - Encourage participation in the decision making process about a behavioral management agreement; implement if patient meets criteria  Outcome: Progressing     Problem: SELF HARM  Goal: Effect of psychiatric condition will be minimized and patient will be protected from self harm  Description: INTERVENTIONS:  - Assess impact of patient's symptoms on level of functioning, self-care needs and offer support as indicated  - Assess patient/family knowledge of depression, impact on illness and need for teaching  - Provide emotional support, presence and reassurance  - Assess for possible suicidal thoughts, ideation or self-harm  If patient expresses suicidal thoughts or statements do not leave alone, notify physician/AP immediately, initiate suicide precautions, and determine need for continual observation  - initiate consults and referrals as appropriate (a mental health professional, Spiritual Care  Outcome: Progressing     Problem: BEHAVIOR  Goal: Pt/Family maintain appropriate behavior and adhere to behavioral management agreement, if implemented  Description: INTERVENTIONS:  - Assess the family dynamic   - Encourage verbalization of thoughts and concerns in a socially appropriate manner  - Assess patient/family's coping skills and non-compliant behavior (including use of illegal substances)  - Utilize positive, consistent limit setting strategies supporting safety of patient, staff and others  - Initiate consult with Case Management, Spiritual Care or other ancillary services as appropriate  - If a patient's/visitor's behavior jeopardizes the safety of the patient, staff, or others, refer to organization procedure     - Notify Security of behavior or suspected illegal substances which indicate the need for search of the patient and/or belongings  - Encourage participation in the decision making process about a behavioral management agreement; implement if patient meets criteria  Outcome: Progressing     Problem: ANXIETY  Goal: Will report anxiety at manageable levels  Description: INTERVENTIONS:  - Administer medication as ordered  - Teach and encourage coping skills  - Provide emotional support  - Assess patient/family for anxiety and ability to cope  Outcome: Progressing     Problem: SLEEP DISTURBANCE  Goal: Will exhibit normal sleeping pattern  Description: Interventions:  -  Assess the patients sleep pattern, noting recent changes  - Administer medication as ordered  - Decrease environmental stimuli, including noise, as appropriate during the night  - Encourage the patient to actively participate in unit groups and or exercise during the day to enhance ability to achieve adequate sleep at night  - Assess the patient, in the morning, encouraging a description of sleep experience  Outcome: Progressing     Problem: SELF CARE DEFICIT  Goal: Return ADL status to a safe level of function  Description: INTERVENTIONS:  - Administer medication as ordered  - Assess ADL deficits and provide assistive devices as needed  - Obtain PT/OT consults as needed  - Assist and instruct patient to increase activity and self care as tolerated  Outcome: Progressing     Problem: DISCHARGE PLANNING  Goal: Discharge to home or other facility with appropriate resources  Description: INTERVENTIONS:  - Identify barriers to discharge w/patient and caregiver  - Arrange for needed discharge resources and transportation as appropriate  - Identify discharge learning needs (meds, wound care, etc )  - Arrange for interpretive services to assist at discharge as needed  - Refer to Case Management Department for coordinating discharge planning if the patient needs post-hospital services based on physician/advanced practitioner order or complex needs related to functional status, cognitive ability, or social support system  Outcome: Progressing     Problem: Ineffective Coping  Goal: Participates in unit activities  Description: Interventions:  - Provide therapeutic environment   - Provide required programming   - Redirect inappropriate behaviors   Outcome: Progressing

## 2021-04-29 NOTE — CONSULTS
[address] [facility] 1955, 63 y o  female MRN: [id number]  Unit/Bed#: Margo Current 202-91 Encounter: [id number]  Primary Care Provider: Enrike Reina DO   Date and time admitted to hospital: 4/28/2021  4:32 PM    Inpatient consult for Medical Clearance for [address] patient  Consult performed by: Oma Zamudio PA-C  Consult ordered by: Saray Blair MD        Medical clearance for psychiatric admission  Assessment & Plan  Patient seen and examined today, medically clear for admission to [facility]  Currently there are no acute medical needs; consult if acute medical needs arise      * Bipolar 1 disorder [facility])  Assessment & Plan  · Patient with chronic hx of bipolar 1 disorder, previous inpatient psychiatric admissions  · Presented to [facility] ED on 4/28/2021 reporting "mental breakdown" with paranoia and decreased sleep  · Currently 201 status  · Management per psychiatry    Essential hypertension  Assessment & Plan  · Patient previously admitted to [facility] October 2020  · At that time, patient was taking amlodipine 5 mg daily, losartan 50 mg daily, and HCTZ 25 mg daily  · During admission, patient was experiencing low BPs and medication regimen was adjusted as follows- continue amlodipine 5 mg daily, Decrease Losartan to 25 mg daily and HCTZ to 12 5 mg daily    · Appears these medications were discontinued upon patient discharge on 10/30/2020  · Patient currently states that she does not take anything for her BP at home  · Per chart review- PCP (Dr Jhon Barnes, [facility]) telephone f/u 11/19/2020 confirmed patient no longer on BP meds; encourage close monitoring of BP and f/u  · Most recent /75, preceding range 117//91  · HR 68, preceding range 68-86  · Continue to monitor vitals, BP  · Consider resuming past BP medications if BP unstable during admission  · Encourage follow-up with PCP for close monitoring and medication management     Acquired hypothyroidism  Assessment & Plan  · TSH 4 760, elevated from 0 721 (October 2020)  · Awaiting T4  · Patient reports that she has not taken levothyroxine for "a long time"- unable to identify date of last use  · Continue levothyroxine 75 mcg  · Encourage follow-up with PCP for close monitoring and medication management     Mild intermittent asthma  Assessment & Plan  · Patient currently denies symptoms of SOB/wheezing  · ED visit in December 2020 for acute exacerbation  · State that she does not use an inhaler regularly  · Continue montelukast daily and albuterol PRN    ECT Clearance:   History of recent seizure or stroke:  no   History of pheochromocytoma:  no   History of active bleeding (Intracranial hemorrhage, aneurysm or AVM):  no   History of metallic implants in the head or neck:  no   History of increased intracranial pressure with mass effect:  no   Does the patient have a current arrhythmia?  no      Based on above criteria, Patient is medically cleared for ECT should it be recommended  Counseling / Coordination of Care Time: 30 minutes  Greater than 50% of total time spent on patient counseling and coordination of care  Collaboration of Care: Were Recommendations Directly Discussed with Primary Treatment Team? - Yes     History of Present Illness: Please note- patient neil historian, uncertain of which meds she is to be taken and what medical conditions she has     Mary Neil is a 63 y o  female with PMH of HTN, hypothyroidism, asthma, and bipolar 1 disorder with past psychiatric who is originally admitted to the psychiatry service due to increased paranoia and decreased sleep  We are consulted for medical clearance for admission to [facility] Unit and treatment of underlying psychiatric illness  Patient presented to [facility] ED on 4/28/2021 reporting "mental breakdown", noting increased paranoia and decreased sleep; possible treatment non-compliance       Patient also with history of HTN and hypothyroidism, however patient states that she does not take medications for these conditions currently  Upon questioning, patient unsure if a doctor told her to stop or if she just stopped on her own  Additionally, she initially denied history of thyroid disorder  Patient notes that the only prescription medications she takes daily are for psychiatric conditions, however notes she recently has not taken them  Otherwise non-compliant with HTN and hypothyroidism treatment  Patient denies current substance use, stating that she no longer smokes and denies current need for nicotine replacement therapy  Also denies consumption of alcohol and use of illicit substances like cocaine and heroin  Review of Systems:    Review of Systems   Constitutional: Positive for appetite change (decreased)  Negative for activity change, chills and fever  HENT: Negative for congestion, ear pain, rhinorrhea, sneezing and sore throat  Eyes: Negative for pain and visual disturbance  Respiratory: Negative for cough, shortness of breath and wheezing  Cardiovascular: Negative for chest pain, palpitations and leg swelling  Gastrointestinal: Negative for abdominal pain, constipation, diarrhea, nausea and vomiting  Genitourinary: Negative for difficulty urinating, dysuria and hematuria  Musculoskeletal: Negative for arthralgias and back pain  Skin: Negative for color change and rash  Neurological: Negative for dizziness, seizures, syncope, weakness, light-headedness, numbness and headaches  Psychiatric/Behavioral: Positive for dysphoric mood and sleep disturbance  The patient is nervous/anxious  All other systems reviewed and are negative        Past Medical and Surgical History:     Past Medical History:   Diagnosis Date    Anxiety     [facility] disorder ([facility] ) 10/21/2020    Depression     Disease of thyroid gland     Hypertension     Panic attack     Psychiatric illness     Psychosis ([facility] ) Past Surgical History:   Procedure Laterality Date    HYSTERECTOMY      TUBAL LIGATION         Meds/Allergies:    PTA meds:   Prior to Admission Medications   Prescriptions Last Dose Informant Patient Reported? Taking? QUEtiapine (SEROquel) 50 mg tablet Unknown at Unknown time  No No   Sig: Take 3 tablets (150 mg total) by mouth daily at bedtime   divalproex sodium (DEPAKOTE ER) 500 mg 24 hr tablet Unknown at Unknown time  No No   Sig: Take 2 tablets (1,000 mg total) by mouth daily at bedtime   levothyroxine 75 mcg tablet Unknown at Unknown time  No No   Sig: Take 1 tablet (75 mcg total) by mouth daily   montelukast (SINGULAIR) 10 mg tablet Unknown at Unknown time  No No   Sig: Take 1 tablet (10 mg total) by mouth daily   nystatin (MYCOSTATIN) powder Unknown at Unknown time  No No   Sig: Apply 1 application topically 2 (two) times a day      Facility-Administered Medications: None       Allergies: Allergies   Allergen Reactions    Emmons        Social History:     Marital Status:     Substance Use History:   Social History     Substance and Sexual Activity   Alcohol Use Never    Frequency: Never     Social History     Tobacco Use   Smoking Status Current Every Day Smoker    Packs/day: 1 00   Smokeless Tobacco Never Used     Social History     Substance and Sexual Activity   Drug Use Never       Family History:    History reviewed  No pertinent family history  Physical Exam:     Vitals:   Blood Pressure: 160/75 (04/29/21 0650)  Pulse: 68 (04/29/21 0650)  Temperature: 97 7 °F (36 5 °C) (04/29/21 0650)  Temp Source: Temporal (04/29/21 0650)  Respirations: 18 (04/29/21 0650)  Height: 5' 2" (157 5 cm) (04/28/21 2213)  Weight - Scale: 95 3 kg (210 lb) (04/28/21 2213)  SpO2: 97 % (04/29/21 0650)    Physical Exam  Vitals signs reviewed  Constitutional:       General: She is not in acute distress  Appearance: Normal appearance  She is obese  She is not ill-appearing or diaphoretic  Comments: Patient resting in bed upon approach   HENT:      Head: Normocephalic and atraumatic  Nose: Nose normal  No congestion or rhinorrhea  Mouth/Throat:      Mouth: Mucous membranes are moist       Pharynx: Oropharynx is clear  Eyes:      General: No scleral icterus  Extraocular Movements: Extraocular movements intact  Conjunctiva/sclera: Conjunctivae normal    Neck:      Musculoskeletal: Normal range of motion  Cardiovascular:      Rate and Rhythm: Normal rate and regular rhythm  Pulses: Normal pulses  Heart sounds: Normal heart sounds  No murmur  No friction rub  No gallop  Pulmonary:      Effort: Pulmonary effort is normal  No respiratory distress  Breath sounds: No wheezing, rhonchi or rales  Abdominal:      General: Abdomen is flat  Bowel sounds are normal  There is no distension  Palpations: Abdomen is soft  Tenderness: There is no abdominal tenderness  There is no guarding  Musculoskeletal: Normal range of motion  General: No swelling  Right lower leg: No edema  Left lower leg: No edema  Skin:     General: Skin is warm and dry  Neurological:      General: No focal deficit present  Mental Status: She is alert and oriented to person, place, and time  Mental status is at baseline  Sensory: No sensory deficit  Psychiatric:         Attention and Perception: Attention normal          Mood and Affect: Mood is anxious and depressed  Speech: Speech is tangential          Behavior: Behavior is cooperative  Thought Content: Thought content is paranoid  Cognition and Memory: Cognition is impaired  Memory is impaired  Judgment: Judgment is impulsive and inappropriate  Additional Data:     Lab Results: I have personally reviewed pertinent reports        Results from last 7 days   Lab Units 04/28/21  1708   WBC Thousand/uL 6 40   HEMOGLOBIN g/dL 13 0   HEMATOCRIT % 40 6   PLATELETS Thousands/uL 365   NEUTROS PCT % 65   LYMPHS PCT % 23*   MONOS PCT % 10   EOS PCT % 2     Results from last 7 days   Lab Units 04/28/21  1709   SODIUM mmol/L 137   POTASSIUM mmol/L 4 6   CHLORIDE mmol/L 101   CO2 mmol/L 24   BUN mg/dL 17   CREATININE mg/dL 0 54*   ANION GAP mmol/L 12   CALCIUM mg/dL 9 9   ALBUMIN g/dL 4 4   TOTAL BILIRUBIN mg/dL 0 26   ALK PHOS U/L 126*   ALT U/L 20   AST U/L 26   GLUCOSE RANDOM mg/dL 92             Lab Results   Component Value Date/Time    HGBA1C 4 8 01/06/2021 09:19 AM    HGBA1C 5 5 04/24/2018 08:03 AM           EKG, Pathology, and Other Studies Reviewed on Admission:   · EKG- none recent; 10/19/2020: normal sinus rhythm, Right superior axis deviation, incomplete RBBB, right ventricular hypertrophy with repolarization abnormality, non-specific T wave abnormality    ** Please Note: This note has been constructed using a voice recognition system   **
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
## 2021-04-29 NOTE — ASSESSMENT & PLAN NOTE
· Patient previously admitted to [facility] October 2020  · At that time, patient was taking amlodipine 5 mg daily, losartan 50 mg daily, and HCTZ 25 mg daily  · During admission, patient was experiencing low BPs and medication regimen was adjusted as follows- continue amlodipine 5 mg daily, Decrease Losartan to 25 mg daily and HCTZ to 12 5 mg daily    · Appears these medications were discontinued upon patient discharge on 10/30/2020  · Patient currently states that she does not take anything for her BP at home  · Per chart review- PCP (Dr Lonnie Salas, [facility]) telephone f/u 11/19/2020 confirmed patient no longer on BP meds; encourage close monitoring of BP and f/u  · Most recent /75, preceding range 117//91  · HR 68, preceding range 68-86  · Continue to monitor vitals, BP  · Consider resuming past BP medications if BP unstable during admission  · Encourage follow-up with PCP for close monitoring and medication management

## 2021-04-29 NOTE — DISCHARGE INSTR - OTHER ORDERS
You are being discharged to: [address], [address] [place] [address] Dr    Triggers you have identified during your hospitalization that led to your admission include: medication noncompliance and poor sleep  Coping skills you have identified during your hospitalization include: reading the Bible  If you are unable to deal with your distressed mood alone, please contact your outpatient provider at [facility]  If that is not effective and you continue to have a distressed mood or feel like you're in crisis, please contact 911 and go to the nearest emergency center  [facility] [facility]: [address] Suicide Prevention Lifeline:  [address] [place] [address] Rd , [address] on Mental Illness ([place]) HELPLINE: 165.804.4110/Website: www natacha org  *Substance Abuse and Mental Health Services Administration([facility]) [facility], which is a confidential, free, 24-hour-a-day, 365-day-a-year, information service for individuals and family members facing mental health and/or substance use disorders  This service provides referrals to local treatment facilities, support groups, and community-based organizations  Callers can also order free publications and other information  Call 3-251.370.7984/Website: www [facility] gov  *[employer] 2-1-1: This is a toll free, confidential, 24-hour-a-day service which connects you to a community  in your area who can help you find services and resources that are available to you locally and provide critical services that can improve and save lives    Call: 211  /Website: https://nii jurado/

## 2021-04-29 NOTE — PROGRESS NOTES
04/29/21 0913   Team Meeting   Meeting Type Daily Rounds   Initial Conference Date 04/29/21   Team Members Present   Team Members Present Physician;Nurse;;; Occupational Therapist   Physician Team Member Dr Kimmie Bellamy; [address]  Team Member [facility] Management Team Member Allyssa Barreto   Social Work Team Member Viviana Gongora   OT Team Member Sathish Uribe   Patient/Family Present   Patient Present No   Patient's Family Present No     Not a 30-day readmission; 201 admission from [facility] ED for increased paranoia, anxiety, med non-compliance, VH and tactile hallucinations  Family is worried that pt cannot live alone anymore -- possible placement needed  Slept well   Previous IPBH at [facility] in 2020

## 2021-04-29 NOTE — ED NOTES
Ele returned call to crisis worker  She expressed concerns about her mother not being able to safely live alone at this point  Ele resides in [place] and has been getting food delivered to her mother the last 6 months, but Pt generally will not cook for herself and eats mainly frozen dinners  The police have been called to the apartment several times recently due to Pt contacting daughter about people stealing her things or that she hates the neighbor  The paranoia varies daily and it appears to Ele and her sister that Pt is not taking her medications as prescribed  Pt recently was sleep walking and had a TV and table fall on her without her recollection  Pt 2 daughters are hoping for Pt to have alternative living placement with more supervision or at the very least an ICM/ACT referral where someone can check in on her and provide her with medications upon her discharge back to the apartment  Ele will contact her sister to provide update on same  Nurses station # was provided for any additional updates

## 2021-04-29 NOTE — PLAN OF CARE
Pt engaged in 2 of 3 groups and completed a self assessment when approached    Problem: Ineffective Coping  Goal: Participates in unit activities  Description: Interventions:  - Provide therapeutic environment   - Provide required programming   - Redirect inappropriate behaviors   Outcome: Progressing

## 2021-04-29 NOTE — NURSING NOTE
Patient visible on the unit  Rated her depression 10/10 anxiety 4/4  Denies suicidal ideation   States '' she don't like too much medication   she don't trust any friends  Lots of stressors in her life  Her daughter committed suicide and her mother passed away recently  She attended group   Stated '' Her family looking for placement '' med and meal compliant will continue to monitor

## 2021-04-29 NOTE — ASSESSMENT & PLAN NOTE
· TSH 4 760, elevated from 0 721 (October 2020)  · Awaiting T4  · Patient reports that she has not taken levothyroxine for "a long time"- unable to identify date of last use  · Continue levothyroxine 75 mcg  · Encourage follow-up with PCP for close monitoring and medication management

## 2021-04-29 NOTE — ED NOTES
Pt gave permission for information to be released to her 2 daughters if they contact the ED/IP unit       Ele[address]

## 2021-04-29 NOTE — ED NOTES
Crisis worker attempted to reach Pt daughter Ele, went to voicemail and voicemail was left  Reached out to [facility] and she requested a  Call back in 5-10 minutes due to being in the car with her children and unable to talk  Crisis to follow up

## 2021-04-29 NOTE — ASSESSMENT & PLAN NOTE
· Patient with chronic hx of bipolar 1 disorder, previous inpatient psychiatric admissions  · Presented to [facility] ED on 4/28/2021 reporting "mental breakdown" with paranoia and decreased sleep  · Currently 201 status  · Management per psychiatry

## 2021-04-29 NOTE — NURSING NOTE
Pt is visible on unit and dayroom  Pt is calm and cooperative with care  Pt is  medication compliant and vss this shift  Pt rated depression 10 and anxiety 4/4 and  denies all other symptoms including SI    Will continue to monitor
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
## 2021-04-29 NOTE — TREATMENT PLAN
TREATMENT PLAN REVIEW - [address] [facility] Elodia Roblero 63 y o  1955 female MRN: [id number]    [address] Room / Bed: Fredis Laboy [facility] Encounter: [id number]        Admit Date/Time:  4/28/2021  4:32 PM    Treatment Team: Attending Provider: Nolan Dale MD; Physician Assistant: Elsa Reed PA-C; Patient Care Assistant: Ascencion Murguia; Nurse Manager: Monse Fletcher, RN; Patient Care Assistant: Minh Martinez; Care Manager: Dharmesh Jeter, RN; : Carmelita Sosa; Occupational Therapy Assistant: EVELIA Schuster;  Resident: Jonny Henry MD; Therapy Student: Martin Mcknight; Registered Nurse: Adriano Harding RN    Diagnosis: Principal Problem:    Bipolar I disorder, current or most recent episode depressed, with psychotic features [facility]  Active Problems:    Essential hypertension    Medical clearance for psychiatric admission    Acquired hypothyroidism    Mild intermittent asthma    Patient Strengths/Assets: capable of independent living, cooperative, communication skills, motivated, negotiates basic needs, patient is on a voluntary commitment, stable housing, supportive family      Patient Barriers/Limitations: limited insight, no/few hobbies or interests, noncompliant with medication, noncompliant with treatment, poor insight, poor reasoning ability    Short Term Goals: decrease in depressive symptoms, decrease in psychotic symptoms, ability to stay safe on the unit, improvement in insight, sleep improvement, improvement in appetite, tolerate medications, mood stabilization, increase in group attendance, increase in group participation, increase in socialization with peers on the unit, acceptance of need for psychiatric treatment, acceptance of psychiatric medications    Long Term Goals: resolution of psychotic symptoms, no agitation on the unit, no aggressive behavior on the unit, able to express basic needs, acceptance of need for psychiatric medications, acceptance of need for psychiatric treatment, acceptance of need for psychiatric follow up after discharge, acceptance of psychiatric diagnosis, stable living arrangements upon discharge    Progress Towards Goals: starting psychiatric medications as prescribed, working on coping skills, still has psychotic symptoms    Recommended Treatment: medication management, patient medication education, group therapy, milieu therapy, continued Behavioral Health psychiatric evaluation/assessment process     Treatment Frequency: daily medication monitoring, group and milieu therapy daily, monitoring through interdisciplinary rounds, monitoring through weekly patient care conferences    Expected Discharge Date: 10 days - 5/9/2021    Discharge Plan: placement in alternative living arrangement    Treatment Plan Created/Updated By: Chandni Barrientos MD

## 2021-04-29 NOTE — NURSING NOTE
Patient remains alert,and pleasant per her baseline  She rested in bed throughout the night with no issues  Patient remains compliant with her medications regimen  Denies suicidal ideation  S/I,H/I, Briana Haney

## 2021-04-29 NOTE — ASSESSMENT & PLAN NOTE
· Patient currently denies symptoms of SOB/wheezing  · ED visit in December 2020 for acute exacerbation  · State that she does not use an inhaler regularly  · Continue montelukast daily and albuterol PRN

## 2021-04-29 NOTE — PROGRESS NOTES
Received message that Leonard Segundo is being admitted from the ER and is needing admission orders  Chart review was done, pertinent information was obtained from the RN and admission orders were placed  Current medications were reviewed, PRN medications for anxiety, sleep, pain and agitation were ordered      Brando Myers MD

## 2021-04-29 NOTE — ED NOTES
Patient is resting comfortably, continuing to round on patient frequently      Latrell Mireya, RN  04/28/21 2124

## 2021-04-29 NOTE — PLAN OF CARE
Problem: DECISION MAKING  Goal: Pt/Family able to effectively weigh alternatives and participate in decision making related to treatment and care  Description: INTERVENTIONS:  - Identify decision maker  - Determine when there are differences among patient's view, family's view, and healthcare provider's view of patient condition and care goals  - Facilitate patient/family articulation of goals for care  - Help patient/family identify pros/cons of alternative solutions  - Provide information as requested by patient/family  - Respect patient/family rights related to privacy and sharing information   - Serve as a liaison between patient, family and health care team  - Initiate consults as appropriate (Ethics Team, Palliative Care, Family Care Conference, etc )  Outcome: Not Progressing     Problem: BEHAVIOR  Goal: Pt/Family maintain appropriate behavior and adhere to behavioral management agreement, if implemented  Description: INTERVENTIONS:  - Assess the family dynamic   - Encourage verbalization of thoughts and concerns in a socially appropriate manner  - Assess patient/family's coping skills and non-compliant behavior (including use of illegal substances)  - Utilize positive, consistent limit setting strategies supporting safety of patient, staff and others  - Initiate consult with Case Management, Spiritual Care or other ancillary services as appropriate  - If a patient's/visitor's behavior jeopardizes the safety of the patient, staff, or others, refer to organization procedure     - Notify Security of behavior or suspected illegal substances which indicate the need for search of the patient and/or belongings  - Encourage participation in the decision making process about a behavioral management agreement; implement if patient meets criteria  Outcome: Not Progressing     Problem: BEHAVIOR  Goal: Pt/Family maintain appropriate behavior and adhere to behavioral management agreement, if implemented  Description: INTERVENTIONS:  - Assess the family dynamic   - Encourage verbalization of thoughts and concerns in a socially appropriate manner  - Assess patient/family's coping skills and non-compliant behavior (including use of illegal substances)  - Utilize positive, consistent limit setting strategies supporting safety of patient, staff and others  - Initiate consult with Case Management, Spiritual Care or other ancillary services as appropriate  - If a patient's/visitor's behavior jeopardizes the safety of the patient, staff, or others, refer to organization procedure     - Notify Security of behavior or suspected illegal substances which indicate the need for search of the patient and/or belongings  - Encourage participation in the decision making process about a behavioral management agreement; implement if patient meets criteria  Outcome: Not Progressing     Problem: ANXIETY  Goal: Will report anxiety at manageable levels  Description: INTERVENTIONS:  - Administer medication as ordered  - Teach and encourage coping skills  - Provide emotional support  - Assess patient/family for anxiety and ability to cope  Outcome: Not Progressing     Problem: SLEEP DISTURBANCE  Goal: Will exhibit normal sleeping pattern  Description: Interventions:  -  Assess the patients sleep pattern, noting recent changes  - Administer medication as ordered  - Decrease environmental stimuli, including noise, as appropriate during the night  - Encourage the patient to actively participate in unit groups and or exercise during the day to enhance ability to achieve adequate sleep at night  - Assess the patient, in the morning, encouraging a description of sleep experience  Outcome: Not Progressing     Problem: SELF CARE DEFICIT  Goal: Return ADL status to a safe level of function  Description: INTERVENTIONS:  - Administer medication as ordered  - Assess ADL deficits and provide assistive devices as needed  - Obtain PT/OT consults as needed  - Assist and instruct patient to increase activity and self care as tolerated  Outcome: Not Progressing

## 2021-04-29 NOTE — PLAN OF CARE
Problem: BEHAVIOR  Goal: Pt/Family maintain appropriate behavior and adhere to behavioral management agreement, if implemented  Description: INTERVENTIONS:  - Assess the family dynamic   - Encourage verbalization of thoughts and concerns in a socially appropriate manner  - Assess patient/family's coping skills and non-compliant behavior (including use of illegal substances)  - Utilize positive, consistent limit setting strategies supporting safety of patient, staff and others  - Initiate consult with Case Management, Spiritual Care or other ancillary services as appropriate  - If a patient's/visitor's behavior jeopardizes the safety of the patient, staff, or others, refer to organization procedure     - Notify Security of behavior or suspected illegal substances which indicate the need for search of the patient and/or belongings  - Encourage participation in the decision making process about a behavioral management agreement; implement if patient meets criteria  4/28/2021 2229 by Ayaka Anderson RN  Outcome: Progressing  4/28/2021 2217 by Ayaka Anderson RN  Outcome: Not Progressing     Problem: DECISION MAKING  Goal: Pt/Family able to effectively weigh alternatives and participate in decision making related to treatment and care  Description: INTERVENTIONS:  - Identify decision maker  - Determine when there are differences among patient's view, family's view, and healthcare provider's view of patient condition and care goals  - Facilitate patient/family articulation of goals for care  - Help patient/family identify pros/cons of alternative solutions  - Provide information as requested by patient/family  - Respect patient/family rights related to privacy and sharing information   - Serve as a liaison between patient, family and health care team  - Initiate consults as appropriate (Ethics Team, Palliative Care, [address] [place] [address], etc )  4/28/2021 2229 by Ayaka Anderson RN  Outcome: Not Progressing  4/28/2021 2217 by Rocco Solo RN  Outcome: Not Progressing     Problem: BEHAVIOR  Goal: Pt/Family maintain appropriate behavior and adhere to behavioral management agreement, if implemented  Description: INTERVENTIONS:  - Assess the family dynamic   - Encourage verbalization of thoughts and concerns in a socially appropriate manner  - Assess patient/family's coping skills and non-compliant behavior (including use of illegal substances)  - Utilize positive, consistent limit setting strategies supporting safety of patient, staff and others  - Initiate consult with Case Management, Spiritual Care or other ancillary services as appropriate  - If a patient's/visitor's behavior jeopardizes the safety of the patient, staff, or others, refer to organization procedure     - Notify Security of behavior or suspected illegal substances which indicate the need for search of the patient and/or belongings  - Encourage participation in the decision making process about a behavioral management agreement; implement if patient meets criteria  Outcome: Not Progressing     Problem: ANXIETY  Goal: Will report anxiety at manageable levels  Description: INTERVENTIONS:  - Administer medication as ordered  - Teach and encourage coping skills  - Provide emotional support  - Assess patient/family for anxiety and ability to cope  4/28/2021 2229 by Rocco Solo, RN  Outcome: Not Progressing  4/28/2021 2217 by Rocco Solo, RN  Outcome: Not Progressing     Problem: SLEEP DISTURBANCE  Goal: Will exhibit normal sleeping pattern  Description: Interventions:  -  Assess the patients sleep pattern, noting recent changes  - Administer medication as ordered  - Decrease environmental stimuli, including noise, as appropriate during the night  - Encourage the patient to actively participate in unit groups and or exercise during the day to enhance ability to achieve adequate sleep at night  - Assess the patient, in the morning, encouraging a description of sleep experience  4/28/2021 2229 by Rachelle Em RN  Outcome: Not Progressing  4/28/2021 2217 by Rachelle Em RN  Outcome: Not Progressing     Problem: SELF CARE DEFICIT  Goal: Return ADL status to a safe level of function  Description: INTERVENTIONS:  - Administer medication as ordered  - Assess ADL deficits and provide assistive devices as needed  - Obtain PT/OT consults as needed  - Assist and instruct patient to increase activity and self care as tolerated  4/28/2021 2229 by Rachelle Em RN  Outcome: Not Progressing  4/28/2021 2217 by Rachelle Em RN  Outcome: Not Progressing

## 2021-04-30 LAB — VALPROATE SERPL-MCNC: 24.8 UG/ML (ref 50–120)

## 2021-04-30 PROCEDURE — 97163 PT EVAL HIGH COMPLEX 45 MIN: CPT

## 2021-04-30 PROCEDURE — 99232 SBSQ HOSP IP/OBS MODERATE 35: CPT | Performed by: PHYSICIAN ASSISTANT

## 2021-04-30 PROCEDURE — 80164 ASSAY DIPROPYLACETIC ACD TOT: CPT | Performed by: PSYCHIATRY & NEUROLOGY

## 2021-04-30 RX ORDER — PRAZOSIN HYDROCHLORIDE 1 MG/1
1 CAPSULE ORAL
Status: DISCONTINUED | OUTPATIENT
Start: 2021-04-30 | End: 2021-05-05 | Stop reason: HOSPADM

## 2021-04-30 RX ORDER — HYDROXYZINE HYDROCHLORIDE 25 MG/1
25 TABLET, FILM COATED ORAL 2 TIMES DAILY
Status: DISCONTINUED | OUTPATIENT
Start: 2021-04-30 | End: 2021-05-02

## 2021-04-30 RX ADMIN — DIVALPROEX SODIUM 1000 MG: 500 TABLET, EXTENDED RELEASE ORAL at 21:33

## 2021-04-30 RX ADMIN — ACETAMINOPHEN 650 MG: 325 TABLET ORAL at 08:35

## 2021-04-30 RX ADMIN — LEVOTHYROXINE SODIUM 75 MCG: 75 TABLET ORAL at 05:38

## 2021-04-30 RX ADMIN — QUETIAPINE FUMARATE 150 MG: 100 TABLET ORAL at 21:33

## 2021-04-30 RX ADMIN — HYDROXYZINE HYDROCHLORIDE 50 MG: 50 TABLET, FILM COATED ORAL at 13:44

## 2021-04-30 RX ADMIN — MONTELUKAST 10 MG: 10 TABLET, FILM COATED ORAL at 08:35

## 2021-04-30 RX ADMIN — HYDROXYZINE HYDROCHLORIDE 25 MG: 25 TABLET, FILM COATED ORAL at 17:05

## 2021-04-30 NOTE — PHYSICAL THERAPY NOTE
Physical Therapy Evaluation    Patient's Name: Kriss Barcenas    Admitting Diagnosis  Bipolar disorder ([place] [address] ) [F31 9]  Bipolar 1 disorder ([facility] ) [F31 9]  Encounter for psychological evaluation [Z00 8]    Problem List  Patient Active Problem List   Diagnosis    Severe mood disorder with psychotic features ( rule out Bipolar disorder, current episode depressed, with psychotic features, vs  Schizoaffective Disorder)   Essential hypertension    Medical clearance for psychiatric admission    Acquired hypothyroidism    Mild intermittent asthma       Past Medical History  Past Medical History:   Diagnosis Date    Anxiety     Bipolar 1 disorder ([place] [address] ) 10/21/2020    Depression     Disease of thyroid gland     Hypertension     Panic attack     Psychiatric illness     Psychosis [facility])        Past Surgical History  Past Surgical History:   Procedure Laterality Date    HYSTERECTOMY      TUBAL LIGATION         Recent Imaging  No orders to display       Recent Vital Signs  Vitals:    04/29/21 0650 04/29/21 1430 04/29/21 2051 04/30/21 0709   BP: 160/75 167/70 126/71 162/93   BP Location: Left arm Left arm Right arm Right arm   Pulse: 68 61 76 90   Resp: 18 20 18 18   Temp: 97 7 °F (36 5 °C) (!) 97 1 °F (36 2 °C) 97 9 °F (36 6 °C) (!) 97 4 °F (36 3 °C)   TempSrc: Temporal Temporal Temporal Tympanic   SpO2: 97% 97% 94% 95%   Weight:       Height:            04/30/21 1030   PT Last Visit   PT Visit Date 04/30/21   Note Type   Note type Evaluation   Pain Assessment   Pain Assessment Tool 0-10   Pain Score 3  (decreased with movement OOB)   Hospital Pain Intervention(s) Repositioned; Ambulation/increased activity   Home Living   Type of [address] One level   Additional Comments one level apartment with no BURTON   Prior Function   Level of [place] Independent with ADLs and functional mobility   ADL Assistance Independent   IADLs Independent   Falls in the last 6 months 1 to 4   Comments ind with all ADLs, needs assist with heavy chores   Restrictions/Precautions   Weight Bearing Precautions Per Order No   General   Family/Caregiver Present No   Cognition   Arousal/Participation Alert   Orientation Level Oriented X4   Memory Within functional limits   Following Commands Follows all commands and directions without difficulty   RLE Assessment   RLE Assessment WFL  (mild weakness)   LLE Assessment   LLE Assessment WFL  (mild weakness)   Coordination   Movements are Fluid and Coordinated 1   Sensation WFL   Bed Mobility   Supine to Sit 6  Modified independent   Additional items Increased time required   Sit to Supine 6  Modified independent   Additional items Increased time required   Transfers   Sit to Stand 6  Modified independent   Additional items Increased time required   Stand to Sit 6  Modified independent   Additional items Increased time required   Additional Comments with no AD   Ambulation/Elevation   Gait pattern Excessively slow; Short stride;Decreased foot clearance   Gait Assistance 5  Supervision   Additional items Verbal cues   Assistive Device None   Distance 200ft   Balance   Static Sitting Fair +   Dynamic Sitting Fair   Static Standing Fair   Dynamic Standing Fair -   Ambulatory Fair -   Endurance Deficit   Endurance Deficit Yes   Endurance Deficit Description fatigue   Activity Tolerance   Activity Tolerance Patient limited by fatigue   Nurse Made Aware spoke to RN   Assessment   Prognosis Good   Problem List Decreased strength;Decreased endurance;Decreased mobility; Impaired balance;Pain   Barriers to Discharge Decreased caregiver support   Goals   Patient Goals to have less back pain   Plan   PT Frequency One time visit   Recommendation   PT Discharge Recommendation No rehabilitation needs   AM-PAC Basic Mobility Inpatient   Turning in Bed Without Bedrails 4   Lying on Back to Sitting on Edge of Flat Bed 4   Moving Bed to Chair 4   Standing Up From Chair 4   Walk in Room 4   Climb 3-5 Stairs 4 Basic Mobility Inpatient Raw Score 24   Basic Mobility Standardized Score 57 68         ASSESSMENT                                                                                                                     Marychuy Singh is a 63 y o  female admitted to [address] [place] [address] on 4/28/2021 for Severe mood disorder with psychotic features ([facility] )  Pt  has a past medical history of Anxiety, Bipolar 1 disorder ([facility] ) (10/21/2020), Depression, Disease of thyroid gland, Hypertension, Panic attack, Psychiatric illness, and Psychosis ([facility] )    PT was consulted and pt was seen on 4/30/2021 for mobility assessment and d/c planning  Pt presents supine in bed alert and agreeable to therapy  She is reporting pain in the lower back which is made better with activity  Sensation tested to LEs and intact to light touch  She demonstrates mild weakness to BLE however with functional strength for transfers and ambulation  No LOB with ambulation although when first standing to ambulate mild unsteadiness noted  Pt is currently functioning at a modified independent assistance level for bed mobility, modified independent assistance level for transfers, supervision assistance x1 level for ambulation with no assistive device  The patient's AM-PAC Basic Mobility Inpatient Short Form Raw Score is 24, Standardized Score is 57 68  A standardized score greater than 42 9 suggests the patient may benefit from discharge to home  Please also refer to the recommendation of the Physical Therapist for safe discharge planning      Recommendations                                                                                                                DME:  None    Discharge Disposition:  Home with no needs      Darrell Connelly PT, DPT

## 2021-04-30 NOTE — PROGRESS NOTES
Progress Note - [email or web address] 63 y o  female MRN: [id number]   Unit/Bed#: [facility] 602-01 Encounter: [id number]    Behavior over the last 24 hours: unchanged  Patricia Donahue is a 26-year-old female with a history of bipolar disorder presents for psychiatric follow-up  Patient was restarted on her Depakote and Seroquel yesterday after being noncompliant for several months  VPA level returned low at 24 8  She continues to endorse paranoid delusions that people are invading her home while she sleeps  She states that she is concerned for her safety on a daily basis  When she does finally get some sleep, she states she is frequently awakened by PTSD related nightmares  She reports feeling severely anxious, all the time  "Was reportedly taking Klonopin and stable on this medication for many years  Received 1 dose of p r n  Atarax 50 mg yesterday  Denies any SI or HI  Denies AH or VH at this time and does not appear internally preoccupied      Sleep: frequent awakenings, nightmares  Appetite: normal  Medication side effects: No   ROS: no complaints, all other systems are negative    Mental Status Evaluation:    Appearance:  age appropriate, casually dressed, adequate grooming   Behavior:  cooperative, calm   Speech:  normal rate and volume, fluent, clear   Mood:  anxious   Affect:  appropriate, reactive   Thought Process:  organized, goal directed, linear   Associations: circumstantial associations   Thought Content:  paranoid delusions, obsessive thoughts, negative thoughts, ruminating thoughts   Perceptual Disturbances: no auditory hallucinations, no visual hallucinations   Risk Potential: Suicidal ideation - None at present, contracts for safety on the unit  Homicidal ideation - None at present  Potential for aggression - No   Sensorium:  oriented to person, place and time/date   Memory:  recent and remote memory grossly intact   Consciousness:  alert and awake   Attention/Concentration: attention span and concentration are age appropriate   Insight:  limited   Judgment: partial   Gait/Station: normal gait/station, normal balance   Motor Activity: no abnormal movements     Vital signs in last 24 hours:    Temp:  [97 1 °F (36 2 °C)-97 9 °F (36 6 °C)] 97 4 °F (36 3 °C)  HR:  [61-90] 90  Resp:  [18-20] 18  BP: (126-167)/(70-93) 162/93    Laboratory results: I have personally reviewed all pertinent laboratory/tests results    Most Recent Labs:   Lab Results   Component Value Date    WBC 6 40 04/28/2021    RBC 4 85 04/28/2021    HGB 13 0 04/28/2021    HCT 40 6 04/28/2021     04/28/2021    RDW 14 7 04/28/2021    NEUTROABS 4 20 04/28/2021    SODIUM 137 04/28/2021    K 4 6 04/28/2021     04/28/2021    CO2 24 04/28/2021    BUN 17 04/28/2021    CREATININE 0 54 (L) 04/28/2021    GLUC 92 04/28/2021    CALCIUM 9 9 04/28/2021    AST 26 04/28/2021    ALT 20 04/28/2021    ALKPHOS 126 (H) 04/28/2021    TP 8 2 04/28/2021    ALB 4 4 04/28/2021    TBILI 0 26 04/28/2021    HDL 52 09/18/2014    TRIG 186 09/18/2014    LDLCALC 90 09/18/2014    VALPROICTOT 24 8 (L) 04/30/2021    TWP1MXGFMGID 4 760 (H) 04/28/2021    FREET4 1 15 04/28/2021    HGBA1C 4 8 01/06/2021    EAG 91 01/06/2021       Progress Toward Goals: remains very anxious, endorsing paranoid delusions, otherwise pleasant and cooperative upon approach    Assessment/Plan   Principal Problem:    Severe mood disorder with psychotic features ( rule out Bipolar disorder, current episode depressed, with psychotic features, vs  Schizoaffective Disorder)  Active Problems:    Essential hypertension    Medical clearance for psychiatric admission    Acquired hypothyroidism    Mild intermittent asthma      Recommended Treatment:     Planned medication and treatment changes:     All current active medications have been reviewed  Encourage group therapy, milieu therapy and occupational therapy  Behavioral Health checks every 7 minutes    Start prazosin 1 mg at bedtime for PTSD-related nightmares  Vital signs reviewed, no episodes of hypotension to this point  Start scheduled hydroxyzine 25 mg twice a day, can gradually uptitrate to 3 times a day if necessary, has been on scheduled Klonopin in the past   Recommend re-evaluating after the weekend  Has resumed Depakote 1000 mg ER at bedtime, VPA level 24 8, will recheck that at a trough level in 3 days  Does not appear manic currently  Has resumed Seroquel 150 mg at bedtime, tolerating well      Current Facility-Administered Medications   Medication Dose Route Frequency Provider Last Rate    acetaminophen  650 mg Oral Q4H PRN Wellington Dent MD      acetaminophen  650 mg Oral Q4H PRN Wellington Dent MD      acetaminophen  975 mg Oral Q6H PRN Wellington Dent MD      albuterol  2 puff Inhalation Q4H PRN Angienasrin Ruffin PA-C      aluminum-magnesium hydroxide-simethicone  30 mL Oral Q4H PRN Wellington Dent MD      artificial tear  1 application Both Eyes E8O PRN Wellington Dent MD      benztropine  1 mg Intramuscular Q4H PRN Max 6/day Wellington Dent MD      benztropine  0 5 mg Oral Q4H PRN Max 6/day Wellington Dent MD      divalproex sodium  1,000 mg Oral HS Deeanna Mortimer, MD      hydrOXYzine HCL  25 mg Oral Q6H PRN Max 4/day Wellington Dent MD      hydrOXYzine HCL  50 mg Oral Q6H PRN Max 4/day Wellington Dent MD      levothyroxine  75 mcg Oral Early Morning Angie Ruffin PA-C      montelukast  10 mg Oral Daily Angienarsin Ruffin PA-C      OLANZapine  5 mg Intramuscular Q3H PRN Max 3/day Wellington Dent MD      OLANZapine  2 5 mg Oral Q4H PRN Max 6/day Wellington Dent MD      OLANZapine  5 mg Oral Q4H PRN Max 3/day Wellington Dent MD      OLANZapine  5 mg Oral Q3H PRN Max 3/day Wellington Dent MD      QUEtiapine  150 mg Oral HS Deeanna Mortimer, MD      senna-docusate sodium  1 tablet Oral Daily PRN Wellington Dent MD      traZODone  50 mg Oral Q6H PRN Max 3/day Wellington Dent MD  traZODone  50 mg Oral HS PRN Terrie Cardona MD       Risks / Benefits of Treatment:    Risks, benefits, and possible side effects of medications explained to patient and patient verbalizes understanding and agreement for treatment  Counseling / Coordination of Care:    Patient's progress discussed with staff in treatment team meeting  Medications, treatment progress and treatment plan reviewed with patient      Gogo Sutherland PA-C 04/30/21

## 2021-04-30 NOTE — PLAN OF CARE
Pt attended 2 of 3 groups today and reported that due to her small living space she can no longer garden and dislikes cooking for one,in spite of having been an [address] in the past Pt  responded to discussion only when prompted and peers recalled for her,her past interests based on unit conversations they had with her    Problem: Ineffective Coping  Goal: Participates in unit activities  Description: Interventions:  - Provide therapeutic environment   - Provide required programming   - Redirect inappropriate behaviors   Outcome: Progressing

## 2021-04-30 NOTE — PROGRESS NOTES
Pt attended [address] education group  Pt was constricted and anxious  Pt needed prompting to participate  Pt able to stay for duration  Pt did note that she lives alone and will return home after d/c   Pt did not elaborate on concerns  Pt respectful of peers  04/30/21 1100   Activity/Group Checklist   Group Other (Comment)  ([facility] education)   Attendance Attended   Attendance Duration (min) 31-45   Interactions Interacted appropriately   Affect/Mood Constricted; Other (Comment)  (anxious)   Goals Achieved Identified feelings; Discussed coping strategies; Discussed self-esteem issues; Able to listen to others; Able to engage in interactions

## 2021-04-30 NOTE — NURSING NOTE
Patient reported anxiety and requested PRN medication  Patient was given PRN hydroxyzine for anxiety

## 2021-04-30 NOTE — PROGRESS NOTES
04/30/21 1425   Team Meeting   Meeting Type Tx Team Meeting   Initial Conference Date 04/30/21   Team Members Present   Team Members Present Physician;Nurse;   Physician Team Member Dr Akbar Beaulieu Team Member [facility] Management Team Member Breanna   Patient/Family Present   Patient Present Yes   Patient's Family Present No     Treatment goals include: maintaining appropriate behavior, refraining from self harm, decreasing anxiety, improving sleep, improving self-care, building effective coping skills, discharge planning

## 2021-04-30 NOTE — CASE MANAGEMENT
04/30/21 0828   Team Meeting   Meeting Type Daily Rounds   Initial Conference Date 04/30/21   Team Members Present   Team Members Present Physician;Nurse;;; Occupational Therapist   Physician Team Member Girish Castellon PA-C   Nursing Team Member [facility] Management Team Member Alon Shelby   Social Work Team Member Kriss   OT Team Member Lucinda Story   Patient/Family Present   Patient Present No   Patient's Family Present No     LCD = 5/3, 2/3 groups yesterday, 10/10 depression, 4/4 anxiety, visible, social, med compliant, PT consult ordered, some paranoia last night regarding meds being poison

## 2021-04-30 NOTE — CASE MANAGEMENT
Left another VM for pt's daughter, Rachel Lundberg [phone]    Call back from Rachel Toño -- she reports that pt's paranoia and delusions are ongoing  Pt has along hx of MH and trauma  Pt's last psych hospitalization was 4 months ago  Pt's paranoia will be targeted at different people including neighbors and her brother, who was helping her out from time to time when she was more stable  Rachel Donnyrosibel reports that pt is not a violent person but the persistent paranoia towards others makes the family nervous  Rachel Lundberg stated that pt admitted to stopping her meds this time because she was afraid of falling asleep and not being alert/ready in case someone does something to her  Rachel Donnyrosibel states that pt has hx of doing things in her sleep such as eating or ripping up paper/pictures  She won't remember doing these things and will become more paranoid thinking that someone else did them  When pt is stable on her meds, she functions well and is independent with all ADLs  Rachel Toño reports that pt's OP therapist recently moved away and now she doesn't have one  Rachel Donnyrosibel would like pt to have an ICM and new therapist as part of her d/c plan  Pt only receives SS and does not have a lot of money  She wouldn't be able to afford [facility] or private-duty A  Met with pt to discuss ICM referral -- she is interested and agreeable  Referral form will be completed and faxed to [facility] [place]  Pt inquired about when she'll be able to go home  Encouraged pt to speak with MD  Pt also expressed feeling anxiety today and not knowing that she can ask her RN for PRN med, if needed  CM provided education  Pt also reported poor night's sleep

## 2021-05-01 PROCEDURE — 99232 SBSQ HOSP IP/OBS MODERATE 35: CPT | Performed by: PSYCHIATRY & NEUROLOGY

## 2021-05-01 RX ORDER — DIVALPROEX SODIUM 500 MG/1
500 TABLET, EXTENDED RELEASE ORAL
Status: DISCONTINUED | OUTPATIENT
Start: 2021-05-02 | End: 2021-05-05 | Stop reason: HOSPADM

## 2021-05-01 RX ADMIN — MONTELUKAST 10 MG: 10 TABLET, FILM COATED ORAL at 09:09

## 2021-05-01 RX ADMIN — DIVALPROEX SODIUM 1000 MG: 500 TABLET, EXTENDED RELEASE ORAL at 21:14

## 2021-05-01 RX ADMIN — LEVOTHYROXINE SODIUM 75 MCG: 75 TABLET ORAL at 06:39

## 2021-05-01 RX ADMIN — PRAZOSIN HYDROCHLORIDE 1 MG: 1 CAPSULE ORAL at 21:14

## 2021-05-01 RX ADMIN — QUETIAPINE FUMARATE 150 MG: 100 TABLET ORAL at 21:15

## 2021-05-01 RX ADMIN — HYDROXYZINE HYDROCHLORIDE 25 MG: 25 TABLET, FILM COATED ORAL at 09:09

## 2021-05-01 RX ADMIN — HYDROXYZINE HYDROCHLORIDE 25 MG: 25 TABLET, FILM COATED ORAL at 17:16

## 2021-05-01 NOTE — NURSING NOTE
Pt initially refused hs medications stating "I am afraid to sleep, I cannot take those"  Pt then explained how she has nightmares frequently and that is why she is afraid  Pt was able to take all medications with encouragement and appeared to sleep all night

## 2021-05-01 NOTE — NURSING NOTE
Patient is present in the dining room for breakfast, otherwise isolated to room  Reports she is very depressed and anxious but wants to go home on Monday  Medication compliant and cooperative with care  Patient did receive Atarax 25 mg/po as a scheduled dose this morning  VPA level is 24 8 and was reported to Dr Tyrel Mckeon  Appetite is fair  Will continue to provide support as needed

## 2021-05-01 NOTE — PROGRESS NOTES
C/O"I think I am Ok "    Report from staff regarding this patient received and record reviewed  prior to seeing this patient   Behavior over the last 24 hours:  Seen on the unit for on going treatment for mood disorder, her VPA level reported 24 at dose of 100 mg day, taking meds, denied issues, still quite talkative  Sleep:ok  Appetite:ok  Medication side effects:denied  ROS:still has pressured speech  Mental Status Evaluation:  Appearance:  Dressed appropraitely   Behavior:  cooperative   Speech:  normal   Mood:  elevated   Affect:  appropriate     Thought Process:  Goal directed   Thought Content:  normal   Perceptual Disturbances: Denied AV hallucination   Risk Potential: NO DEVI    Sensorium:  normal   Cognition:  intact   Consciousness:  Alert, OX3   Attention: Fair   Insight:  limited   Judgment: limited   Gait/Station: With in normal range   Motor Activity: With in normal range     Progress Toward Goals: working on current treatment goals, no changes  Made in treatment plan   Recommended Treatment: Continue with group therapy, milieu therapy and occupational therapy  Risks, benefits and possible side effects of Medications:   Risks, benefits, and possible side effects of medications explained to patient and patient verbalizes understanding        Medications:   current meds:   Current Facility-Administered Medications   Medication Dose Route Frequency    acetaminophen (TYLENOL) tablet 650 mg  650 mg Oral Q4H PRN    acetaminophen (TYLENOL) tablet 650 mg  650 mg Oral Q4H PRN    acetaminophen (TYLENOL) tablet 975 mg  975 mg Oral Q6H PRN    albuterol (PROVENTIL HFA,VENTOLIN HFA) inhaler 2 puff  2 puff Inhalation Q4H PRN    aluminum-magnesium hydroxide-simethicone (MYLANTA) oral suspension 30 mL  30 mL Oral Q4H PRN    artificial tear (LUBRIFRESH P M ) ophthalmic ointment 1 application  1 application Both Eyes P3M PRN    benztropine (COGENTIN) injection 1 mg  1 mg Intramuscular Q4H PRN Max 6/day  benztropine (COGENTIN) tablet 0 5 mg  0 5 mg Oral Q4H PRN Max 6/day    divalproex sodium (DEPAKOTE ER) 24 hr tablet 1,000 mg  1,000 mg Oral HS    [START ON 5/2/2021] divalproex sodium (DEPAKOTE ER) 24 hr tablet 500 mg  500 mg Oral Daily With Breakfast    hydrOXYzine HCL (ATARAX) tablet 25 mg  25 mg Oral Q6H PRN Max 4/day    hydrOXYzine HCL (ATARAX) tablet 25 mg  25 mg Oral BID    hydrOXYzine HCL (ATARAX) tablet 50 mg  50 mg Oral Q6H PRN Max 4/day    levothyroxine tablet 75 mcg  75 mcg Oral Early Morning    montelukast (SINGULAIR) tablet 10 mg  10 mg Oral Daily    OLANZapine (ZyPREXA) IM injection 5 mg  5 mg Intramuscular Q3H PRN Max 3/day    OLANZapine (ZyPREXA) tablet 2 5 mg  2 5 mg Oral Q4H PRN Max 6/day    OLANZapine (ZyPREXA) tablet 5 mg  5 mg Oral Q4H PRN Max 3/day    OLANZapine (ZyPREXA) tablet 5 mg  5 mg Oral Q3H PRN Max 3/day    prazosin (MINIPRESS) capsule 1 mg  1 mg Oral HS    QUEtiapine (SEROquel) tablet 150 mg  150 mg Oral HS    senna-docusate sodium (SENOKOT S) 8 6-50 mg per tablet 1 tablet  1 tablet Oral Daily PRN    traZODone (DESYREL) tablet 50 mg  50 mg Oral Q6H PRN Max 3/day    traZODone (DESYREL) tablet 50 mg  50 mg Oral HS PRN     Labs: NA    Assessment, Diagnosis  and Plan: continue with current meds and goals VPA level 24 F/U tomorrow and get vpa level on monday    Counseling / Coordination of Care  Total floor / unit time spent today20 minutes  minutes  Greater than 50% of total time was spent with the patient and / or family counseling and / or coordination of care   A description of the counseling / coordination of care: added  mg am and will get VPA level on Monday     Michel Choudhary MD

## 2021-05-02 PROCEDURE — 99232 SBSQ HOSP IP/OBS MODERATE 35: CPT | Performed by: PSYCHIATRY & NEUROLOGY

## 2021-05-02 RX ORDER — HYDROXYZINE 50 MG/1
50 TABLET, FILM COATED ORAL 2 TIMES DAILY
Status: DISCONTINUED | OUTPATIENT
Start: 2021-05-02 | End: 2021-05-03

## 2021-05-02 RX ORDER — NYSTATIN 100000 [USP'U]/G
POWDER TOPICAL 2 TIMES DAILY PRN
Status: DISCONTINUED | OUTPATIENT
Start: 2021-05-02 | End: 2021-05-05 | Stop reason: HOSPADM

## 2021-05-02 RX ADMIN — DIVALPROEX SODIUM 500 MG: 500 TABLET, EXTENDED RELEASE ORAL at 08:23

## 2021-05-02 RX ADMIN — MONTELUKAST 10 MG: 10 TABLET, FILM COATED ORAL at 08:23

## 2021-05-02 RX ADMIN — DIVALPROEX SODIUM 1000 MG: 500 TABLET, EXTENDED RELEASE ORAL at 21:08

## 2021-05-02 RX ADMIN — PRAZOSIN HYDROCHLORIDE 1 MG: 1 CAPSULE ORAL at 21:08

## 2021-05-02 RX ADMIN — LEVOTHYROXINE SODIUM 75 MCG: 75 TABLET ORAL at 06:37

## 2021-05-02 RX ADMIN — QUETIAPINE FUMARATE 150 MG: 100 TABLET ORAL at 21:08

## 2021-05-02 RX ADMIN — DOCUSATE SODIUM 50 MG AND SENNOSIDES 8.6 MG 1 TABLET: 8.6; 5 TABLET, FILM COATED ORAL at 09:05

## 2021-05-02 RX ADMIN — HYDROXYZINE HYDROCHLORIDE 50 MG: 50 TABLET, FILM COATED ORAL at 08:23

## 2021-05-02 NOTE — NURSING NOTE
Patient reports last bowel movement 4/28  Patient was offered and accepted Senna S  Will continue to monitor and support as needed

## 2021-05-02 NOTE — PROGRESS NOTES
C/O" I am high metabolizers and atarax 5 mg does not work for me I take 48 which works for me "    Report from staff regarding this patient received and record reviewed  prior to seeing this patient   Behavior over the last 24 hours: This patient as well as staff reported that atarax 25 mg is not enough for her anxiety  , denied any  other issues  Sleep:ok  Appetite:ok  Medication side effects:none  ROS:anxiety, still has racing thoughts at time  Mental Status Evaluation:  Appearance:  Dressed in hospital gown, fair grooming    Behavior:  cooperative   Speech:  normal   Mood:  anxious   Affect:  appropriate    Thought Process:  Goal directed   Thought Content:  normal   Perceptual Disturbances: Denied AV hallucination   Risk Potential: NO DEVI    Sensorium:  normal   Cognition:  intact   Consciousness:  Alert, OX3   Attention: Fair   Insight:  fair   Judgment: fair   Gait/Station: With in normal range   Motor Activity: With in normal range     Progress Toward Goals: working on current treatment goals, no changes  Made in treatment plan   Recommended Treatment: Continue with group therapy, milieu therapy and occupational therapy  Risks, benefits and possible side effects of Medications:   Risks, benefits, and possible side effects of medications explained to patient and patient verbalizes understanding        Medications:   current meds:   Current Facility-Administered Medications   Medication Dose Route Frequency    acetaminophen (TYLENOL) tablet 650 mg  650 mg Oral Q4H PRN    acetaminophen (TYLENOL) tablet 650 mg  650 mg Oral Q4H PRN    acetaminophen (TYLENOL) tablet 975 mg  975 mg Oral Q6H PRN    albuterol (PROVENTIL HFA,VENTOLIN HFA) inhaler 2 puff  2 puff Inhalation Q4H PRN    aluminum-magnesium hydroxide-simethicone (MYLANTA) oral suspension 30 mL  30 mL Oral Q4H PRN    artificial tear (LUBRIFRESH P M ) ophthalmic ointment 1 application  1 application Both Eyes H4S PRN    benztropine (COGENTIN) injection 1 mg  1 mg Intramuscular Q4H PRN Max 6/day    benztropine (COGENTIN) tablet 0 5 mg  0 5 mg Oral Q4H PRN Max 6/day    divalproex sodium (DEPAKOTE ER) 24 hr tablet 1,000 mg  1,000 mg Oral HS    divalproex sodium (DEPAKOTE ER) 24 hr tablet 500 mg  500 mg Oral Daily With Breakfast    hydrOXYzine HCL (ATARAX) tablet 25 mg  25 mg Oral Q6H PRN Max 4/day    hydrOXYzine HCL (ATARAX) tablet 50 mg  50 mg Oral Q6H PRN Max 4/day    hydrOXYzine HCL (ATARAX) tablet 50 mg  50 mg Oral BID    levothyroxine tablet 75 mcg  75 mcg Oral Early Morning    montelukast (SINGULAIR) tablet 10 mg  10 mg Oral Daily    OLANZapine (ZyPREXA) IM injection 5 mg  5 mg Intramuscular Q3H PRN Max 3/day    OLANZapine (ZyPREXA) tablet 2 5 mg  2 5 mg Oral Q4H PRN Max 6/day    OLANZapine (ZyPREXA) tablet 5 mg  5 mg Oral Q4H PRN Max 3/day    OLANZapine (ZyPREXA) tablet 5 mg  5 mg Oral Q3H PRN Max 3/day    prazosin (MINIPRESS) capsule 1 mg  1 mg Oral HS    QUEtiapine (SEROquel) tablet 150 mg  150 mg Oral HS    senna-docusate sodium (SENOKOT S) 8 6-50 mg per tablet 1 tablet  1 tablet Oral Daily PRN    traZODone (DESYREL) tablet 50 mg  50 mg Oral Q6H PRN Max 3/day    traZODone (DESYREL) tablet 50 mg  50 mg Oral HS PRN     Labs: NA    Assessment, Diagnosis  and Plan: continue with current meds and goals, F/U tomorrow    Counseling / Coordination of Care  Total floor / unit time spent today20 minutes  minutes  Greater than 50% of total time was spent with the patient and / or family counseling and / or coordination of care   A description of the counseling / coordination of care: , changed atarax 50 m bid     Nettie Smallwood MD

## 2021-05-02 NOTE — NURSING NOTE
Patient is present on the unit, sitting in the dining room for most of the day  Reports depression is better but still has anxiety 3/4  Denies all other symptoms  Medication compliant and cooperative with care  Patient reports she has not had the urgency to void and she feels "full " Appetite is good  Will continue to monitor and provide support as needed

## 2021-05-02 NOTE — NURSING NOTE
Patient is having urinary retention  Bladder scan was >999  Patient voided 100 ml  Straight catheter for 1050  PVR 16 ml

## 2021-05-02 NOTE — NURSING NOTE
Patient tells RN that she isn't "peeing" enough  Patient was asked to urinate in a collection container so RN can measure urine output  Patient agrees to use collection container

## 2021-05-02 NOTE — NURSING NOTE
Pt out in the dayroom most of the evening, seen socializing with a select peer  She appears less anxious this evening and did not require a PRN  Compliant with hs medications and is asleep in bed

## 2021-05-03 LAB — VALPROATE SERPL-MCNC: 73.8 UG/ML (ref 50–120)

## 2021-05-03 PROCEDURE — 80164 ASSAY DIPROPYLACETIC ACD TOT: CPT | Performed by: PSYCHIATRY & NEUROLOGY

## 2021-05-03 PROCEDURE — 99232 SBSQ HOSP IP/OBS MODERATE 35: CPT | Performed by: PSYCHIATRY & NEUROLOGY

## 2021-05-03 RX ORDER — CLONAZEPAM 0.5 MG/1
0.5 TABLET ORAL 2 TIMES DAILY
Status: DISCONTINUED | OUTPATIENT
Start: 2021-05-03 | End: 2021-05-05 | Stop reason: HOSPADM

## 2021-05-03 RX ORDER — SERTRALINE HYDROCHLORIDE 25 MG/1
25 TABLET, FILM COATED ORAL DAILY
Status: DISCONTINUED | OUTPATIENT
Start: 2021-05-03 | End: 2021-05-05 | Stop reason: HOSPADM

## 2021-05-03 RX ORDER — QUETIAPINE FUMARATE 100 MG/1
100 TABLET, FILM COATED ORAL
Status: DISCONTINUED | OUTPATIENT
Start: 2021-05-03 | End: 2021-05-05 | Stop reason: HOSPADM

## 2021-05-03 RX ADMIN — DIVALPROEX SODIUM 500 MG: 500 TABLET, EXTENDED RELEASE ORAL at 08:21

## 2021-05-03 RX ADMIN — DIVALPROEX SODIUM 1000 MG: 500 TABLET, EXTENDED RELEASE ORAL at 21:08

## 2021-05-03 RX ADMIN — DOCUSATE SODIUM 50 MG AND SENNOSIDES 8.6 MG 1 TABLET: 8.6; 5 TABLET, FILM COATED ORAL at 17:19

## 2021-05-03 RX ADMIN — MONTELUKAST 10 MG: 10 TABLET, FILM COATED ORAL at 08:21

## 2021-05-03 RX ADMIN — QUETIAPINE FUMARATE 100 MG: 100 TABLET ORAL at 21:08

## 2021-05-03 RX ADMIN — ACETAMINOPHEN 650 MG: 325 TABLET ORAL at 04:55

## 2021-05-03 RX ADMIN — LEVOTHYROXINE SODIUM 75 MCG: 75 TABLET ORAL at 06:30

## 2021-05-03 RX ADMIN — CLONAZEPAM 0.5 MG: 0.5 TABLET ORAL at 17:19

## 2021-05-03 RX ADMIN — SERTRALINE HYDROCHLORIDE 25 MG: 25 TABLET ORAL at 14:52

## 2021-05-03 RX ADMIN — PRAZOSIN HYDROCHLORIDE 1 MG: 1 CAPSULE ORAL at 21:07

## 2021-05-03 NOTE — PLAN OF CARE
Problem: DECISION MAKING  Goal: Pt/Family able to effectively weigh alternatives and participate in decision making related to treatment and care  Description: INTERVENTIONS:  - Identify decision maker  - Determine when there are differences among patient's view, family's view, and healthcare provider's view of patient condition and care goals  - Facilitate patient/family articulation of goals for care  - Help patient/family identify pros/cons of alternative solutions  - Provide information as requested by patient/family  - Respect patient/family rights related to privacy and sharing information   - Serve as a liaison between patient, family and health care team  - Initiate consults as appropriate (Ethics Team, Palliative Care, Family Care Conference, etc )  Outcome: Progressing     Problem: BEHAVIOR  Goal: Pt/Family maintain appropriate behavior and adhere to behavioral management agreement, if implemented  Description: INTERVENTIONS:  - Assess the family dynamic   - Encourage verbalization of thoughts and concerns in a socially appropriate manner  - Assess patient/family's coping skills and non-compliant behavior (including use of illegal substances)  - Utilize positive, consistent limit setting strategies supporting safety of patient, staff and others  - Initiate consult with Case Management, Spiritual Care or other ancillary services as appropriate  - If a patient's/visitor's behavior jeopardizes the safety of the patient, staff, or others, refer to organization procedure     - Notify Security of behavior or suspected illegal substances which indicate the need for search of the patient and/or belongings  - Encourage participation in the decision making process about a behavioral management agreement; implement if patient meets criteria  Outcome: Progressing     Problem: SELF HARM  Goal: Effect of psychiatric condition will be minimized and patient will be protected from self harm  Description: INTERVENTIONS:  - Assess impact of patient's symptoms on level of functioning, self-care needs and offer support as indicated  - Assess patient/family knowledge of depression, impact on illness and need for teaching  - Provide emotional support, presence and reassurance  - Assess for possible suicidal thoughts, ideation or self-harm  If patient expresses suicidal thoughts or statements do not leave alone, notify physician/AP immediately, initiate suicide precautions, and determine need for continual observation  - initiate consults and referrals as appropriate (a mental health professional, Spiritual Care  Outcome: Progressing     Problem: BEHAVIOR  Goal: Pt/Family maintain appropriate behavior and adhere to behavioral management agreement, if implemented  Description: INTERVENTIONS:  - Assess the family dynamic   - Encourage verbalization of thoughts and concerns in a socially appropriate manner  - Assess patient/family's coping skills and non-compliant behavior (including use of illegal substances)  - Utilize positive, consistent limit setting strategies supporting safety of patient, staff and others  - Initiate consult with Case Management, Spiritual Care or other ancillary services as appropriate  - If a patient's/visitor's behavior jeopardizes the safety of the patient, staff, or others, refer to organization procedure     - Notify Security of behavior or suspected illegal substances which indicate the need for search of the patient and/or belongings  - Encourage participation in the decision making process about a behavioral management agreement; implement if patient meets criteria  Outcome: Progressing     Problem: ANXIETY  Goal: Will report anxiety at manageable levels  Description: INTERVENTIONS:  - Administer medication as ordered  - Teach and encourage coping skills  - Provide emotional support  - Assess patient/family for anxiety and ability to cope  Outcome: Progressing     Problem: SLEEP DISTURBANCE  Goal: Will exhibit normal sleeping pattern  Description: Interventions:  -  Assess the patients sleep pattern, noting recent changes  - Administer medication as ordered  - Decrease environmental stimuli, including noise, as appropriate during the night  - Encourage the patient to actively participate in unit groups and or exercise during the day to enhance ability to achieve adequate sleep at night  - Assess the patient, in the morning, encouraging a description of sleep experience  Outcome: Progressing     Problem: SELF CARE DEFICIT  Goal: Return ADL status to a safe level of function  Description: INTERVENTIONS:  - Administer medication as ordered  - Assess ADL deficits and provide assistive devices as needed  - Obtain PT/OT consults as needed  - Assist and instruct patient to increase activity and self care as tolerated  Outcome: Progressing     Problem: Ineffective Coping  Goal: Participates in unit activities  Description: Interventions:  - Provide therapeutic environment   - Provide required programming   - Redirect inappropriate behaviors   Outcome: Progressing

## 2021-05-03 NOTE — NURSING NOTE
Patient is present on the unit and social at times  Reports anxiety 4/4 throughout the day denies all other symptoms  Medication compliant and cooperative with care  Patient not having any urinary trouble today  Still has not had a bowel movement  CEDRIC LEWIS was given  Patient reports she had a Covid vaccine at [facility] and she is due for another shot on Wednesday this week 5/5/21  Appetite is good  Ambulates well  Will continue to monitor and provide support as needed

## 2021-05-03 NOTE — PROGRESS NOTES
Attended two of two groups  Participated spontaneously and appropriately in both  05/03/21 1000 05/03/21 1330   Activity/Group Checklist   Group [facility] [employer]  (Types of bird trivia ) Life Skills  (Challenges and choices task/discussion)   Attendance Attended Attended  (Excused to meet with doctor/returned)   Attendance Duration (min) 16-30 16-30   Interactions Interacted appropriately Interacted appropriately   Affect/Mood Appropriate Appropriate;Calm   Goals Achieved Able to listen to others; Able to engage in interactions Able to listen to others; Identified feelings; Able to engage in interactions; Able to reflect/comment on own behavior;Able to recieve feedback; Discussed coping strategies  (Identified living alone as a challenge)

## 2021-05-03 NOTE — CASE MANAGEMENT
Spoke with pt's daughter, Shimon Soliz, to get pt's new cell phone number for the ICM referral  CM gave update on pt's progress and aftercare plan  Shimon Soliz and her sister plan on calling [facility] directly to advocate for pt's getting set up with a new OP therapist right away instead of waiting for her next med management appt on the 19th      ICM referral completed and faxed to [facility] [place]

## 2021-05-03 NOTE — CASE MANAGEMENT
05/03/21 0841   Team Meeting   Meeting Type Daily Rounds   Initial Conference Date 05/03/21   Team Members Present   Team Members Present Physician;Nurse;;; Occupational Therapist   Physician Team Member [phone]   Team Member [facility] Management Team Member Marek Ervin   Social Work Team Member Kriss   OT Team Member Clinton Almonte   Patient/Family Present   Patient Present No   Patient's Family Present No     2/4 anxiety, med compliant, urinary retention -- straight cath over the weekend, slept well, med compliant, LCD = 5/3, slow improvement

## 2021-05-03 NOTE — PROGRESS NOTES
Progress Note - 1225 [facility] 63 y o  female MRN: [id number]  Unit/Bed#: Josefina Doherty 363-14 Encounter: [id number]    Assessment/Plan   Principal Problem:    Severe mood disorder with psychotic features ( rule out Bipolar disorder, current episode depressed, with psychotic features, vs  Schizoaffective Disorder)  Active Problems:    Essential hypertension    Medical clearance for psychiatric admission    Acquired hypothyroidism    Mild intermittent asthma  Patient continues to display moderate level of anxiety with affective symptoms and pronounced PTSD symptoms  Admits to flashbacks, nightmares, hypervigilance, and avoidant behaviors  Does not endorse any manic episodes when explained thoroughly to the patient  Reported success with Zoloft for multiple years in the past with no jeff  Will restart this medication at this time with a Klonopin bridge for a few days for acute anxiety with plan to taper  Will continue Prazosin for PTSD/nightmare and continue on Depakote for now for mood stability  Patient retaining urine which has resolved but will discontinue Hydroxyzine and decrease Seroquel to address anticholinergic symptoms  Recommended Treatment:   1) Start Zoloft 25 mg QD for mood, anxiety, and PTSD symptoms  2) Start Klonopin 0 5 mg BID to bridge for current high anxiety and plan to taper off in coming days  3) Continue Depakote 500 mg QD with breakfast and 1000 mg QHS  4) Continue Prazosin 1 mg QHS for nightmares and PTSD symptoms  5) Decrease Seroquel to 100 mg QHS for mood stabilization and sleep  6) Discontinue Hydroxyzine 50 mg due to anticholinergic side effects    Continue with group therapy, milieu therapy and occupational therapy  Continue frequent safety checks and vitals per unit protocol    Case discussed with treatment team   Risks, benefits and possible side effects of Medications: Risks, benefits, and possible side effects of medications have been explained to the patient, who verbalizes understanding    ------------------------------------------------------------    Subjective: Per nursing report, Joy Gonzales has been cooperative on the unit and compliant with medications  Today, Joy Gonzales is consenting for safety on the unit  She reports that her anxiety is currently under decent control at 2/4 and depression is 3/10  Denies SI/HI/AVH and reports that urinary retention has resolved off Atarax  Indicates that sleep is still a problem at 5 hours last night  States she has never had manic episode before and had good control of affective symptoms in the past on Zoloft, wishes to restart this  Does not endorse paranoia of unit and states she feels safe now  Does state her neighbors would stare at her when in her house outside though  Indicates that she would just ignore them if that continued once discharged  Agreeable to medication adjustments      Progress Toward Goals: slow improvement    Psychiatric Review of Systems:  Behavior over the last 24 hours: mild improvement  Sleep: insomnia  Appetite: adequate  Medication side effects: urinary retention  ROS: insomnia, anxiety    Vital signs in last 24 hours:  Temp:  [97 4 °F (36 3 °C)-97 6 °F (36 4 °C)] 97 6 °F (36 4 °C)  HR:  [59-79] 59  Resp:  [16-18] 16  BP: (129-169)/(69-84) 129/69    Mental Status Exam:  Appearance:  alert, good eye contact, appropriate grooming and hygiene and overweight   Behavior:  calm, cooperative and sitting comfortably   Motor: psychomotor agitation and normal gait and balance   Speech:  spontaneous, normal rate, normal volume and coherent   Mood:  "much better"   Affect:  mood-incongruent, constricted, depressed and anxious   Thought Process:  organized, goal directed, normal rate of thoughts   Thought Content: delusions of neighbors starring at her   Perceptual disturbances: no reported hallucinations and does not appear to be responding to internal stimuli at this time   Risk Potential: No active or passive suicidal or homicidal ideation was verbalized during interview   Cognition: oriented to self and situation, memory grossly intact, appears to be of average intelligence, attention span appeared shorter than expected for age and cognition not formally tested   Insight:  Fair   Judgment: Fair     Current Medications:  Current Facility-Administered Medications   Medication Dose Route Frequency Provider Last Rate    acetaminophen  650 mg Oral Q4H PRN Oralexandra Naqvi, MD      acetaminophen  650 mg Oral Q4H PRN Orvan Getting, MD      acetaminophen  975 mg Oral Q6H PRN Orvan Getting, MD      albuterol  2 puff Inhalation Q4H PRN Angie Ruffin PA-C      aluminum-magnesium hydroxide-simethicone  30 mL Oral Q4H PRN Orvan Getting, MD      artificial tear  1 application Both Eyes Q1E PRN Oralexandra Naqvi, MD      benztropine  1 mg Intramuscular Q4H PRN Max 6/day Orvan Getting, MD      benztropine  0 5 mg Oral Q4H PRN Max 6/day Orvan Getting, MD      divalproex sodium  1,000 mg Oral HS Rotutu Saliva, MD      divalproex sodium  500 mg Oral Daily With Ul  Fuentes Johnson MD      hydrOXYzine HCL  25 mg Oral Q6H PRN Max 4/day Orvan Getting, MD      hydrOXYzine HCL  50 mg Oral Q6H PRN Max 4/day Orvan Getting, MD      hydrOXYzine HCL  50 mg Oral BID Negrita Ardon MD      levothyroxine  75 mcg Oral Early Morning Angie Ruffin PA-C      montelukast  10 mg Oral Daily Angie Ruffin PA-C      nystatin   Topical BID PRN JUN Myles      OLANZapine  5 mg Intramuscular Q3H PRN Max 3/day Orvan Getting, MD      OLANZapine  2 5 mg Oral Q4H PRN Max 6/day Orvan Getting, MD      OLANZapine  5 mg Oral Q4H PRN Max 3/day Orvan Getting, MD      OLANZapine  5 mg Oral Q3H PRN Max 3/day Orvan Getting, MD      prazosin  1 mg Oral HS Joe SocksZULAY      QUEtiapine  150 mg Oral HS Jihan Saliva, MD      senna-docusate sodium  1 tablet Oral Daily PRN OrMD Drake Eddy traZODone  50 mg Oral Q6H PRN Max 3/day Isabel Li MD      traZODone  50 mg Oral HS PRN Isabel Li MD         Behavioral Health Medications: all current active meds have been reviewed  Changes as in plan section above  Laboratory results:  I have personally reviewed all pertinent laboratory/tests results    Recent Results (from the past 48 hour(s))   Valproic acid level, total    Collection Time: 05/03/21  5:16 AM   Result Value Ref Range    Valproic Acid, Total 73 8 50 - 120 ug/mL        Deanna Dancer, DO

## 2021-05-03 NOTE — PLAN OF CARE
Pt  Attended both groups as scheduled today and displayed better focus to group topics    Problem: Ineffective Coping  Goal: Participates in unit activities  Description: Interventions:  - Provide therapeutic environment   - Provide required programming   - Redirect inappropriate behaviors   Outcome: Progressing

## 2021-05-03 NOTE — NURSING NOTE
Pt voided multiple times this shift  She is no longer having urinary retention, states she had the same reaction to medication in the past  Reports 2/4 anxiety, denies further symptoms, compliant with medications and is asleep in bed

## 2021-05-04 LAB
ALBUMIN SERPL BCP-MCNC: 3.6 G/DL (ref 3–5.2)
ALP SERPL-CCNC: 105 U/L (ref 43–122)
ALT SERPL W P-5'-P-CCNC: 17 U/L
ANION GAP SERPL CALCULATED.3IONS-SCNC: 6 MMOL/L (ref 5–14)
AST SERPL W P-5'-P-CCNC: 19 U/L (ref 14–36)
BASOPHILS # BLD AUTO: 0 THOUSANDS/ΜL (ref 0–0.1)
BASOPHILS NFR BLD AUTO: 1 % (ref 0–1)
BILIRUB SERPL-MCNC: 0.21 MG/DL
BUN SERPL-MCNC: 10 MG/DL (ref 5–25)
CALCIUM SERPL-MCNC: 9 MG/DL (ref 8.4–10.2)
CHLORIDE SERPL-SCNC: 103 MMOL/L (ref 97–108)
CO2 SERPL-SCNC: 28 MMOL/L (ref 22–30)
CREAT SERPL-MCNC: 0.48 MG/DL (ref 0.6–1.2)
EOSINOPHIL # BLD AUTO: 0.2 THOUSAND/ΜL (ref 0–0.4)
EOSINOPHIL NFR BLD AUTO: 4 % (ref 0–6)
ERYTHROCYTE [DISTWIDTH] IN BLOOD BY AUTOMATED COUNT: 14.3 %
GFR SERPL CREATININE-BSD FRML MDRD: 105 ML/MIN/1.73SQ M
GLUCOSE P FAST SERPL-MCNC: 78 MG/DL (ref 70–99)
GLUCOSE SERPL-MCNC: 78 MG/DL (ref 70–99)
HCT VFR BLD AUTO: 35.8 % (ref 36–46)
HGB BLD-MCNC: 11.7 G/DL (ref 12–16)
LYMPHOCYTES # BLD AUTO: 1.4 THOUSANDS/ΜL (ref 0.5–4)
LYMPHOCYTES NFR BLD AUTO: 34 % (ref 25–45)
MCH RBC QN AUTO: 27.5 PG (ref 26–34)
MCHC RBC AUTO-ENTMCNC: 32.7 G/DL (ref 31–36)
MCV RBC AUTO: 84 FL (ref 80–100)
MONOCYTES # BLD AUTO: 0.5 THOUSAND/ΜL (ref 0.2–0.9)
MONOCYTES NFR BLD AUTO: 11 % (ref 1–10)
NEUTROPHILS # BLD AUTO: 2 THOUSANDS/ΜL (ref 1.8–7.8)
NEUTS SEG NFR BLD AUTO: 50 % (ref 45–65)
PLATELET # BLD AUTO: 262 THOUSANDS/UL (ref 150–450)
PMV BLD AUTO: 8.1 FL (ref 8.9–12.7)
POTASSIUM SERPL-SCNC: 4.4 MMOL/L (ref 3.6–5)
PROT SERPL-MCNC: 6.8 G/DL (ref 5.9–8.4)
RBC # BLD AUTO: 4.25 MILLION/UL (ref 4–5.2)
SODIUM SERPL-SCNC: 137 MMOL/L (ref 137–147)
VALPROATE SERPL-MCNC: 70.8 UG/ML (ref 50–120)
WBC # BLD AUTO: 4 THOUSAND/UL (ref 4.5–11)

## 2021-05-04 PROCEDURE — 80164 ASSAY DIPROPYLACETIC ACD TOT: CPT | Performed by: PSYCHIATRY & NEUROLOGY

## 2021-05-04 PROCEDURE — 80053 COMPREHEN METABOLIC PANEL: CPT | Performed by: PSYCHIATRY & NEUROLOGY

## 2021-05-04 PROCEDURE — 85025 COMPLETE CBC W/AUTO DIFF WBC: CPT | Performed by: PSYCHIATRY & NEUROLOGY

## 2021-05-04 PROCEDURE — 99232 SBSQ HOSP IP/OBS MODERATE 35: CPT | Performed by: PSYCHIATRY & NEUROLOGY

## 2021-05-04 RX ADMIN — MONTELUKAST 10 MG: 10 TABLET, FILM COATED ORAL at 08:34

## 2021-05-04 RX ADMIN — LEVOTHYROXINE SODIUM 75 MCG: 75 TABLET ORAL at 06:00

## 2021-05-04 RX ADMIN — PRAZOSIN HYDROCHLORIDE 1 MG: 1 CAPSULE ORAL at 21:24

## 2021-05-04 RX ADMIN — DIVALPROEX SODIUM 1000 MG: 500 TABLET, EXTENDED RELEASE ORAL at 21:24

## 2021-05-04 RX ADMIN — BISACODYL 5 MG: 5 TABLET, COATED ORAL at 17:39

## 2021-05-04 RX ADMIN — DIVALPROEX SODIUM 500 MG: 500 TABLET, EXTENDED RELEASE ORAL at 08:34

## 2021-05-04 RX ADMIN — CLONAZEPAM 0.5 MG: 0.5 TABLET ORAL at 17:23

## 2021-05-04 RX ADMIN — SERTRALINE HYDROCHLORIDE 25 MG: 25 TABLET ORAL at 08:34

## 2021-05-04 RX ADMIN — CLONAZEPAM 0.5 MG: 0.5 TABLET ORAL at 08:33

## 2021-05-04 RX ADMIN — QUETIAPINE FUMARATE 100 MG: 100 TABLET ORAL at 21:23

## 2021-05-04 NOTE — CASE MANAGEMENT
05/04/21 0832   Team Meeting   Meeting Type Daily Rounds   Initial Conference Date 05/04/21   Team Members Present   Team Members Present Physician;Nurse;;; Occupational Therapist   Physician Team Member Dr Melodie Bourgeois Team Member [facility] Management Team Member [address] [place] [address] Work Team Member Kriss   OT Team Member Juan C Narayan   Patient/Family Present   Patient Present No   Patient's Family Present No     LCD = 5/5, slept well, denying s/s, visible, social, 1/4 anxiety, 2/10 depression, med compliant, Depakote level = 70 8, reports feeling better

## 2021-05-04 NOTE — NURSING NOTE
Pt visible in the community  Affect is blunted  Reported depression 2/10 and anxiety 1/4  Stated, "I feel good"  Medication compliant  Appetite 100% for meals  Attended groups  Will continue to monitor and maintain q 7 min checks

## 2021-05-04 NOTE — NURSING NOTE
Slept well  Denies s/s   Questioning if she will be receiving 2nd covid shot for Weds , received 1 st shot at [facility] on [address]  Not sure which vaccine it was

## 2021-05-04 NOTE — PLAN OF CARE
Problem: ANXIETY  Goal: Will report anxiety at manageable levels  Description: INTERVENTIONS:  - Administer medication as ordered  - Teach and encourage coping skills  - Provide emotional support  - Assess patient/family for anxiety and ability to cope  Outcome: Progressing     Problem: SELF HARM  Goal: Effect of psychiatric condition will be minimized and patient will be protected from self harm  Description: INTERVENTIONS:  - Assess impact of patient's symptoms on level of functioning, self-care needs and offer support as indicated  - Assess patient/family knowledge of depression, impact on illness and need for teaching  - Provide emotional support, presence and reassurance  - Assess for possible suicidal thoughts, ideation or self-harm  If patient expresses suicidal thoughts or statements do not leave alone, notify physician/AP immediately, initiate suicide precautions, and determine need for continual observation    - initiate consults and referrals as appropriate (a mental health professional, Spiritual Care  Outcome: Progressing

## 2021-05-04 NOTE — PROGRESS NOTES
Patient participated in task and discussion with prompting from staff, struggling to think of contemplative activities on her own  Patient stated finding the group helpful and took tree of contemplative practices with her to use at home  Will continue to encourage exploration of meaningful leisure activities and coping mechanisms  05/04/21 1330   Activity/Group Checklist   Group Spiritual resources  (Tree of contempative practices)   Attendance Attended   Attendance Duration (min) 31-45   Interactions Interacted appropriately   Affect/Mood Appropriate;Calm;Normal range   Goals Achieved Discussed coping strategies; Able to listen to others; Able to engage in interactions

## 2021-05-04 NOTE — PROGRESS NOTES
Pt attended [address] recovery: support and hope group  Pt making progress on her mh recovery goals  Pt noted improvements she has made during admission  05/04/21 1000   Activity/Group Checklist   Group Other (Comment)  ([facility] recovery: support and hope)   Attendance Attended   Attendance Duration (min) 31-45   Interactions Interacted appropriately   Affect/Mood Appropriate   Goals Achieved Identified feelings; Discussed coping strategies; Able to listen to others; Able to engage in interactions

## 2021-05-04 NOTE — PLAN OF CARE
Pt attended and engaged in three of three groups today and completed a relapse prevention plan  Pt  displaying readiness for discharge    Problem: Ineffective Coping  Goal: Participates in unit activities  Description: Interventions:  - Provide therapeutic environment   - Provide required programming   - Redirect inappropriate behaviors   Outcome: Progressing

## 2021-05-04 NOTE — PROGRESS NOTES
Progress Note - [facility] 63 [facility]  female MRN: [id number]  Unit/Bed#: Yasmine Otero 313-04 Encounter: [id number]    Assessment/Plan   Principal Problem:    Severe mood disorder with psychotic features ( rule out Bipolar disorder, current episode depressed, with psychotic features, vs  Schizoaffective Disorder)  Active Problems:    Essential hypertension    Medical clearance for psychiatric admission    Acquired hypothyroidism    Mild intermittent asthma  Patient displays a stable mood and affective and reports control of psychiatric symptoms at this time  Anxiety is well managed with BZD bridge and she is tolerating Zoloft which will provide her with good relief of her PTSD symptoms in addition to the anxiety moving forward  Will continue Depakote as she is therapeutic at 70 8 currently  Nightmares are resolved and will continue Prazosin  Denies being anticholinergic at this time  Patient will discharge tomorrow provided continued improvement and follow up at [facility] for mental health care in addition to [facility] referral to help with compliance  Recommended Treatment:   1) Continue Zoloft 25 mg QD for mood, anxiety, and PTSD symptoms  2) Continue Klonopin 0 5 mg BID to bridge for current high anxiety and discharge with small quantity PRN   3) Continue Depakote 500 mg QD with breakfast and 1000 mg QHS  4) Continue Prazosin 1 mg QHS for nightmares and PTSD symptoms  5) Continue Seroquel 100 mg QHS for mood stabilization and sleep  6) Plan to discharge tomorrow 5/5/21 provided continued improvement  7) Follow up with [facility] mental health care in addition to ICM referral to help with compliance  Continue with group therapy, milieu therapy and occupational therapy  Continue frequent safety checks and vitals per unit protocol    Case discussed with treatment team   Risks, benefits and possible side effects of Medications: Risks, benefits, and possible side effects of medications have been explained to the patient, who verbalizes understanding    ------------------------------------------------------------    Subjective: Per nursing report, Valentina Allen has been cooperative on the unit and compliant with medications  Today, Valentina Allen is consenting for safety on the unit  She reports resolution of depression and anxiety and feeling "better overall " Denies SI/HI/AVH or side effects to medications  Indicates she is eating and sleeping without difficulty  States she is ready for discharge and plans to follow up with outpatient mental health provider  Per nursing, patient has been attending groups, is actively involved in her care, and goal oriented  Patient endorses no further concerns  Progress Toward Goals: good improvement    Psychiatric Review of Systems:  Behavior over the last 24 hours: improved  Sleep: normal  Appetite: adequate  Medication side effects: none verbalized  ROS: Complete review of systems is negative except as noted above      Vital signs in last 24 hours:  Temp:  [97 6 °F (36 4 °C)-97 8 °F (36 6 °C)] 97 6 °F (36 4 °C)  HR:  [69-80] 79  Resp:  [16-20] 16  BP: (148-169)/(73-79) 153/79    Mental Status Exam:  Appearance:  alert, good eye contact, appropriate grooming and hygiene and overweight   Behavior:  calm, cooperative and sitting comfortably   Motor: no abnormal movements and normal gait and balance   Speech:  spontaneous, normal rate, normal volume and coherent   Mood:  "better overall"   Affect:  mood-congruent, euthymic and brighter than previous days   Thought Process:  organized, goal directed, normal rate of thoughts   Thought Content: no verbalized delusions or overt paranoia   Perceptual disturbances: no reported hallucinations and does not appear to be responding to internal stimuli at this time   Risk Potential: No active or passive suicidal or homicidal ideation was verbalized during interview, Low potential for aggression based on previous behavior   Cognition: oriented to self and situation, memory grossly intact, appears to be of average intelligence, age-appropriate attention span and concentration and cognition not formally tested   Insight:  Good   Judgment: Good     Current Medications:  Current Facility-Administered Medications   Medication Dose Route Frequency Provider Last Rate    acetaminophen  650 mg Oral Q4H PRN Wellington Dent MD      acetaminophen  650 mg Oral Q4H PRN Wellington Dent MD      acetaminophen  975 mg Oral Q6H PRN Wellington Dent MD      albuterol  2 puff Inhalation Q4H PRN Angie DefranciscoZULAY      aluminum-magnesium hydroxide-simethicone  30 mL Oral Q4H PRN Wellington Dent MD      artificial tear  1 application Both Eyes O1P PRN Wellington Dent MD      benztropine  1 mg Intramuscular Q4H PRN Max 6/day Wellington Dent MD      benztropine  0 5 mg Oral Q4H PRN Max 6/day Wellington Dent MD      clonazePAM  0 5 mg Oral BID Char Helm, DO      divalproex sodium  1,000 mg Oral HS Deeanna Mortimer, MD      divalproex sodium  500 mg Oral Daily With Ul  Fuentes Johnson MD      hydrOXYzine HCL  25 mg Oral Q6H PRN Max 4/day Wellington Dent MD      hydrOXYzine HCL  50 mg Oral Q6H PRN Max 4/day Wellington Dent MD      levothyroxine  75 mcg Oral Early Morning Angie Defrancisco, PA-C      montelukast  10 mg Oral Daily Angie DefranciscoZULAY      nystatin   Topical BID PRN JUN Mendosa      OLANZapine  5 mg Intramuscular Q3H PRN Max 3/day Wellington Dent MD      OLANZapine  2 5 mg Oral Q4H PRN Max 6/day Wellington Dent MD      OLANZapine  5 mg Oral Q4H PRN Max 3/day Wellington Dent MD      OLANZapine  5 mg Oral Q3H PRN Max 3/day Wellington Dent MD      prazosin  1 mg Oral HS ELLIOT MartinesC      QUEtiapine  100 mg Oral HS Char Helm, DO      senna-docusate sodium  1 tablet Oral Daily PRN Wellington Dent MD      sertraline  25 mg Oral Daily Char Helm, DO      traZODone  50 mg Oral Q6H PRN Max 3/day Wellington Dent MD  traZODone  50 mg Oral HS PRN Sebastian Coburn MD         Behavioral Health Medications: all current active meds have been reviewed  Changes as in plan section above  Laboratory results:  I have personally reviewed all pertinent laboratory/tests results    Recent Results (from the past 48 hour(s))   Valproic acid level, total    Collection Time: 05/03/21  5:16 AM   Result Value Ref Range    Valproic Acid, Total 73 8 50 - 120 ug/mL   CBC and differential    Collection Time: 05/04/21  6:15 AM   Result Value Ref Range    WBC 4 00 (L) 4 50 - 11 00 Thousand/uL    RBC 4 25 4 00 - 5 20 Million/uL    Hemoglobin 11 7 (L) 12 0 - 16 0 g/dL    Hematocrit 35 8 (L) 36 0 - 46 0 %    MCV 84 80 - 100 fL    MCH 27 5 26 0 - 34 0 pg    MCHC 32 7 31 0 - 36 0 g/dL    RDW 14 3 <15 3 %    MPV 8 1 (L) 8 9 - 12 7 fL    Platelets 468 388 - 257 Thousands/uL    Neutrophils Relative 50 45 - 65 %    Lymphocytes Relative 34 25 - 45 %    Monocytes Relative 11 (H) 1 - 10 %    Eosinophils Relative 4 0 - 6 %    Basophils Relative 1 0 - 1 %    Neutrophils Absolute 2 00 1 80 - 7 80 Thousands/µL    Lymphocytes Absolute 1 40 0 50 - 4 00 Thousands/µL    Monocytes Absolute 0 50 0 20 - 0 90 Thousand/µL    Eosinophils Absolute 0 20 0 00 - 0 40 Thousand/µL    Basophils Absolute 0 00 0 00 - 0 10 Thousands/µL   Comprehensive metabolic panel    Collection Time: 05/04/21  6:15 AM   Result Value Ref Range    Sodium 137 137 - 147 mmol/L    Potassium 4 4 3 6 - 5 0 mmol/L    Chloride 103 97 - 108 mmol/L    CO2 28 22 - 30 mmol/L    ANION GAP 6 5 - 14 mmol/L    BUN 10 5 - 25 mg/dL    Creatinine 0 48 (L) 0 60 - 1 20 mg/dL    Glucose 78 70 - 99 mg/dL    Glucose, Fasting 78 70 - 99 mg/dL    Calcium 9 0 8 4 - 10 2 mg/dL    AST 19 14 - 36 U/L    ALT 17 <35 U/L    Alkaline Phosphatase 105 43 - 122 U/L    Total Protein 6 8 5 9 - 8 4 g/dL    Albumin 3 6 3 0 - 5 2 g/dL    Total Bilirubin 0 21 <1 30 mg/dL    eGFR 105 >60 ml/min/1 73sq m   Valproic acid level, total Collection Time: 05/04/21  6:15 AM   Result Value Ref Range    Valproic Acid, Total 70 8 50 - 120 ug/mL        Sujey Matta DO

## 2021-05-04 NOTE — CASE MANAGEMENT
Left VM for pt's daughter, Anthony Reyna [phone], to give update on anticipated d/c tomorrow  [facility] request submitted for d/c transport tomorrow

## 2021-05-04 NOTE — NURSING NOTE
Patient was visible in the milieu watching tv but kept to herself  VSS  Denies all s/s at this time  No complaints offered  Had snack  Patient was cooperative and compliant with HS medications  Safety checks ongoing

## 2021-05-04 NOTE — CASE MANAGEMENT
Spoke with pt's daughters, Jc Kendrick and Ele, to give update on progress and d/c plan  They are aware of anticipated d/c tomorrow and [facility] transportation home  They both worry that pt will stop taking her meds again when she returns home and wish that pt could have daily check-ins to ensure med compliance  CM reviewed private-duty [facility] option and reminded them that ICM referral was made for an extra layer of support  They will continue to call [facility] to advocate for pt being set up with a new therapist asap  They also inquired about social activities/groups that pt could partake in so she has people to talk to when she's home  CM will provide pt with list of virtual  social groups that has been compiled by [facility] intern

## 2021-05-05 VITALS
RESPIRATION RATE: 18 BRPM | OXYGEN SATURATION: 94 % | HEART RATE: 72 BPM | TEMPERATURE: 97.9 F | HEIGHT: 62 IN | SYSTOLIC BLOOD PRESSURE: 143 MMHG | BODY MASS INDEX: 39.6 KG/M2 | DIASTOLIC BLOOD PRESSURE: 76 MMHG | WEIGHT: 215.17 LBS

## 2021-05-05 PROBLEM — Z00.8 MEDICAL CLEARANCE FOR PSYCHIATRIC ADMISSION: Status: RESOLVED | Noted: 2020-10-21 | Resolved: 2021-05-05

## 2021-05-05 PROBLEM — F39 SEVERE MOOD DISORDER WITH PSYCHOTIC FEATURES (HCC): Status: RESOLVED | Noted: 2020-10-21 | Resolved: 2021-05-05

## 2021-05-05 PROCEDURE — 99232 SBSQ HOSP IP/OBS MODERATE 35: CPT | Performed by: PSYCHIATRY & NEUROLOGY

## 2021-05-05 RX ORDER — SERTRALINE HYDROCHLORIDE 25 MG/1
25 TABLET, FILM COATED ORAL DAILY
Qty: 30 TABLET | Refills: 0 | Status: SHIPPED | OUTPATIENT
Start: 2021-05-06 | End: 2021-06-09

## 2021-05-05 RX ORDER — QUETIAPINE FUMARATE 100 MG/1
100 TABLET, FILM COATED ORAL
Qty: 30 TABLET | Refills: 0 | Status: SHIPPED | OUTPATIENT
Start: 2021-05-05 | End: 2021-06-09

## 2021-05-05 RX ORDER — CLONAZEPAM 0.5 MG/1
TABLET ORAL
Qty: 9 TABLET | Refills: 0 | Status: SHIPPED | OUTPATIENT
Start: 2021-05-05 | End: 2021-06-01 | Stop reason: ALTCHOICE

## 2021-05-05 RX ORDER — DIVALPROEX SODIUM 500 MG/1
1000 TABLET, EXTENDED RELEASE ORAL
Qty: 60 TABLET | Refills: 0 | Status: SHIPPED | OUTPATIENT
Start: 2021-05-05 | End: 2021-06-01 | Stop reason: ALTCHOICE

## 2021-05-05 RX ORDER — DIVALPROEX SODIUM 500 MG/1
500 TABLET, EXTENDED RELEASE ORAL
Qty: 30 TABLET | Refills: 0 | Status: SHIPPED | OUTPATIENT
Start: 2021-05-06 | End: 2021-06-01 | Stop reason: ALTCHOICE

## 2021-05-05 RX ORDER — PRAZOSIN HYDROCHLORIDE 1 MG/1
1 CAPSULE ORAL
Qty: 30 CAPSULE | Refills: 0 | Status: SHIPPED | OUTPATIENT
Start: 2021-05-05 | End: 2021-06-01 | Stop reason: ALTCHOICE

## 2021-05-05 RX ADMIN — CLONAZEPAM 0.5 MG: 0.5 TABLET ORAL at 08:17

## 2021-05-05 RX ADMIN — LEVOTHYROXINE SODIUM 75 MCG: 75 TABLET ORAL at 05:54

## 2021-05-05 RX ADMIN — SERTRALINE HYDROCHLORIDE 25 MG: 25 TABLET ORAL at 08:16

## 2021-05-05 RX ADMIN — DIVALPROEX SODIUM 500 MG: 500 TABLET, EXTENDED RELEASE ORAL at 08:17

## 2021-05-05 RX ADMIN — MONTELUKAST 10 MG: 10 TABLET, FILM COATED ORAL at 08:16

## 2021-05-05 NOTE — BH TRANSITION RECORD
Contact Information: If you have any questions, concerns, pended studies, tests and/or procedures, or emergencies regarding your inpatient behavioral health visit  Please contact [address] [place] [address] older adult behavioral health unit [phone] and ask to speak to a , nurse or physician  A contact is available 24 hours/ 7 days a week at this number  Summary of Procedures Performed During your Stay:  Below is a list of major procedures performed during your hospital stay and a summary of results:  - No major procedures performed  Pending Studies (From admission, onward)    None        If studies are pending at discharge, follow up with your PCP and/or referring provider

## 2021-05-05 NOTE — CASE MANAGEMENT
05/05/21 0918   Team Meeting   Meeting Type Daily Rounds   Initial Conference Date 05/05/21   Team Members Present   Team Members Present Physician;Nurse;;; Occupational Therapist   Physician Team Member Dr Gina Bach Team Member [facility] Management Team Member [address] [place] [address] Work Team Member Jas Roche   OT Team Member Little Schilling   Patient/Family Present   Patient Present No   Patient's Family Present No     LCD = 5/5, discharge today, slept, med compliant, calm, cooperative

## 2021-05-05 NOTE — PLAN OF CARE
Problem: DECISION MAKING  Goal: Pt/Family able to effectively weigh alternatives and participate in decision making related to treatment and care  Description: INTERVENTIONS:  - Identify decision maker  - Determine when there are differences among patient's view, family's view, and healthcare provider's view of patient condition and care goals  - Facilitate patient/family articulation of goals for care  - Help patient/family identify pros/cons of alternative solutions  - Provide information as requested by patient/family  - Respect patient/family rights related to privacy and sharing information   - Serve as a liaison between patient, family and health care team  - Initiate consults as appropriate (Ethics Team, Palliative Care, Family Care Conference, etc )  Outcome: Completed     Problem: BEHAVIOR  Goal: Pt/Family maintain appropriate behavior and adhere to behavioral management agreement, if implemented  Description: INTERVENTIONS:  - Assess the family dynamic   - Encourage verbalization of thoughts and concerns in a socially appropriate manner  - Assess patient/family's coping skills and non-compliant behavior (including use of illegal substances)  - Utilize positive, consistent limit setting strategies supporting safety of patient, staff and others  - Initiate consult with Case Management, Spiritual Care or other ancillary services as appropriate  - If a patient's/visitor's behavior jeopardizes the safety of the patient, staff, or others, refer to organization procedure     - Notify Security of behavior or suspected illegal substances which indicate the need for search of the patient and/or belongings  - Encourage participation in the decision making process about a behavioral management agreement; implement if patient meets criteria  Outcome: Completed     Problem: SELF HARM  Goal: Effect of psychiatric condition will be minimized and patient will be protected from self harm  Description: INTERVENTIONS:  - Assess impact of patient's symptoms on level of functioning, self-care needs and offer support as indicated  - Assess patient/family knowledge of depression, impact on illness and need for teaching  - Provide emotional support, presence and reassurance  - Assess for possible suicidal thoughts, ideation or self-harm  If patient expresses suicidal thoughts or statements do not leave alone, notify physician/AP immediately, initiate suicide precautions, and determine need for continual observation  - initiate consults and referrals as appropriate (a mental health professional, Spiritual Care  Outcome: Completed     Problem: BEHAVIOR  Goal: Pt/Family maintain appropriate behavior and adhere to behavioral management agreement, if implemented  Description: INTERVENTIONS:  - Assess the family dynamic   - Encourage verbalization of thoughts and concerns in a socially appropriate manner  - Assess patient/family's coping skills and non-compliant behavior (including use of illegal substances)  - Utilize positive, consistent limit setting strategies supporting safety of patient, staff and others  - Initiate consult with Case Management, Spiritual Care or other ancillary services as appropriate  - If a patient's/visitor's behavior jeopardizes the safety of the patient, staff, or others, refer to organization procedure     - Notify Security of behavior or suspected illegal substances which indicate the need for search of the patient and/or belongings  - Encourage participation in the decision making process about a behavioral management agreement; implement if patient meets criteria  Outcome: Completed     Problem: ANXIETY  Goal: Will report anxiety at manageable levels  Description: INTERVENTIONS:  - Administer medication as ordered  - Teach and encourage coping skills  - Provide emotional support  - Assess patient/family for anxiety and ability to cope  Outcome: Completed     Problem: SLEEP DISTURBANCE  Goal: Will exhibit normal sleeping pattern  Description: Interventions:  -  Assess the patients sleep pattern, noting recent changes  - Administer medication as ordered  - Decrease environmental stimuli, including noise, as appropriate during the night  - Encourage the patient to actively participate in unit groups and or exercise during the day to enhance ability to achieve adequate sleep at night  - Assess the patient, in the morning, encouraging a description of sleep experience  Outcome: Completed     Problem: SELF CARE DEFICIT  Goal: Return ADL status to a safe level of function  Description: INTERVENTIONS:  - Administer medication as ordered  - Assess ADL deficits and provide assistive devices as needed  - Obtain PT/OT consults as needed  - Assist and instruct patient to increase activity and self care as tolerated  Outcome: Completed     Problem: Ineffective Coping  Goal: Participates in unit activities  Description: Interventions:  - Provide therapeutic environment   - Provide required programming   - Redirect inappropriate behaviors   Outcome: Completed

## 2021-05-05 NOTE — DISCHARGE SUMMARY
Discharge Summary - [address] [facility] Osbaldo 63 y o  female MRN: [id number]  Unit/Bed#: Reji Ferrari 064-74 Encounter: [id number]     Admission Date:   Admission Orders (From admission, onward)     Ordered        04/28/21 2113  ED TO SAME [facility] UNIT (using Admission Navigator) - Admit Patient to [address] [place] [address]  Once                       Discharge Date: 05/05/21     Attending Psychiatrist: Parviz Portillo MD     Discharge Diagnosis:   Principal Problem:    Severe mood disorder with psychotic features ( rule out Bipolar disorder, current episode depressed, with psychotic features, vs  Schizoaffective Disorder)  Active Problems:    Essential hypertension    Acquired hypothyroidism    Mild intermittent asthma      Reason for Admission:   [facility] is a 63 y o  female who initially presented with Signs of acute psychosis  [facility] initially reported paranoid delusions of people gassing her and sneaking into her apartment in addition to raping her in her sleep  Admitted to medication non-compliance  she was admitted to the psychiatric unit on a voluntary 201 commitment  Please see initial H&P for full details  Hospital Course: On admission, [facility] was restarted on Depakote 1000 mg QHS for mood stabilization and Seroquel 150 mg QHS for paranoid delusions  She was started on Prazosin 1 mg QHS for PTSD related nightmares and Hydroxyzine 25 mg BID  Added Depakote 500 mg QD when depakote level found to be 24 8  Her medications were titrated as appropriate, including increasing Atarax to 50 mg BID which lead to development of urinary retention and was subsequently discontinued  Seroquel was also decreased to 100 mg QHS to decrease anticholinergic side effects  She was also started on Zoloft 25 mg QD for mood, anxiety, and PTSD symptoms as she endorsed doing well on this for many years without any recorded jeff   Klonopin bridge of 0 5 mg BID was started for high anxiety with plan to taper off following discharge  She tolerated these medications well overall but did have side effects of urinary retention and constipation which resolved with medication changes and dulcolax respectively  The patient's mood brightened over the course of treatment, and she was seen in [facility] interacting appropriately with peers  Tawny Sparks did not demonstrate dangerous behavior to self or others during her inpatient stay  Patient was discharge at 11 am via [facility] Filter pickup with referrals for outpatient therapy and medication management follow up at [facility] in addition to [facility] referral  Patient was discharged with month supply of Depakote 1000 mg QHS and 500 mg QD, Prazosin 1 mg QHS, Seroquel 100 mg QHS, and Zoloft 25 mg QD  Will continue patient on 3 more days of Klonopin 0 5 mg BID bridge to cover anxiety while other medications establish blood levels  Last VPA level 70 8 and stabilized  On the day of discharge, Tawny Sparks denied suicidal or homicidal ideations  She reported she is symptom free and denies depression, anxiety, SI/HI/AVH, or side effects to medications  Indicates she was able to go to the bathroom following dulcolax administration  States she "feels like a different person" and that she is "happy I came in for some help " Reports one nightmare last night but overall improvement with quantity and intensity of nightmares since admission  Understands and is agreeable with follow up plan  Denies any paranoid delusions and is forward thinking  Per nursing, patient has been medication compliant and attending groups  States she is eating and sleeping without difficulty  I reviewed with Tawny Sparks the importance of compliance with medications and outpatient treatment after discharge , I discussed the medication regimen and possible side effects of the medications with Tawny Sparks prior to discharge  At the time of discharge she was tolerating psychiatric medications  , I discussed outpatient follow up with Tawny Sparks , I reviewed with Tawny Sparks crisis plan and safety plan upon discharge  and Leonard Segundo was competent to understand risks and benefits of withholding information and risks and benefits of her actions  Labs/Imaging:   I have personally reviewed all pertinent laboratory/tests results  Mental Status Exam:  Appearance:  alert, good eye contact, appropriate grooming and hygiene and overweight   Behavior:  calm, cooperative and sitting comfortably   Motor: no abnormal movements and normal gait and balance   Speech:  spontaneous, normal rate, normal volume and coherent   Mood:  "I feel like a different person"   Affect:  mood-congruent, euthymic and brighter than previous days   Thought Process:  organized, goal directed, normal rate of thoughts   Thought Content: no verbalized delusions or overt paranoia   Perceptual disturbances: no reported hallucinations and does not appear to be responding to internal stimuli at this time   Risk Potential: No active or passive suicidal or homicidal ideation was verbalized during interview, Low potential for aggression based on previous behavior   Cognition: oriented to self and situation, memory grossly intact, appears to be of average intelligence, normal abstract reasoning, age-appropriate attention span and concentration and cognition not formally tested   Insight:  Good   Judgment: Good     Discharge Medications:  See list below, as well as the after visit summary containing reconciled discharge medications provided to patient and family        Current Facility-Administered Medications   Medication Dose Route Frequency Provider Last Rate    acetaminophen  650 mg Oral Q4H PRN Ross Le MD      acetaminophen  650 mg Oral Q4H PRN Ross Le MD      acetaminophen  975 mg Oral Q6H PRN Ross Le MD      albuterol  2 puff Inhalation Q4H PRN Angie Ruffin PA-C      aluminum-magnesium hydroxide-simethicone  30 mL Oral Q4H PRN Ross Le MD      artificial tear  1 application Both Eyes Q3H PRN Norma MD Fuentes      benztropine  1 mg Intramuscular Q4H PRN Max 6/day Norma MD Fuentes      benztropine  0 5 mg Oral Q4H PRN Max 6/day Norma MD Fuentes      bisacodyl  5 mg Oral Daily PRN Emi Chain, CRNP      clonazePAM  0 5 mg Oral BID Kimani Princeton, DO      divalproex sodium  1,000 mg Oral HS Chandni Barrientos MD      divalproex sodium  500 mg Oral Daily With Briana Johnson MD      hydrOXYzine HCL  25 mg Oral Q6H PRN Max 4/day Norma MD Fuentes      hydrOXYzine HCL  50 mg Oral Q6H PRN Max 4/day Norma MD Fuentes      levothyroxine  75 mcg Oral Early Morning Angie DefranciscoZULAY      montelukast  10 mg Oral Daily Angie DefranciscZULAY quarles      nystatin   Topical BID PRN Emi Chain, CRNP      OLANZapine  5 mg Intramuscular Q3H PRN Max 3/day Normarhina Dill MD      OLANZapine  2 5 mg Oral Q4H PRN Max 6/day Norma MD Fuentes      OLANZapine  5 mg Oral Q4H PRN Max 3/day Normarhina Dill MD      OLANZapine  5 mg Oral Q3H PRN Max 3/day Norma MD Fuentes      prazosin  1 mg Oral HS Arcelia Huang PA-C      QUEtiapine  100 mg Oral HS Kimani Princeton, DO      sertraline  25 mg Oral Daily Kimani Princeton, DO      traZODone  50 mg Oral Q6H PRN Max 3/day Normarhina Dill MD      traZODone  50 mg Oral HS PRN Norma MD Fuentes          Discharge instructions/Information to patient and family:   See after visit summary for information provided to patient and family  Provisions for Follow-Up Care:  See after visit summary for information related to follow-up care and any pertinent home health orders

## 2021-05-05 NOTE — NURSING NOTE
Patient was visible in the milieu watching tv in the small while socializing with select staff  VSS  Denies all s/s at this time  No complaints offered  Had snack  Patient was cooperative and compliant with HS medications  Safety checks ongoing

## 2021-05-05 NOTE — PLAN OF CARE
Problem: DISCHARGE PLANNING  Goal: Discharge to home or other facility with appropriate resources  Description: INTERVENTIONS:  - Identify barriers to discharge w/patient and caregiver  - Arrange for needed discharge resources and transportation as appropriate  - Identify discharge learning needs (meds, wound care, etc )  - Arrange for interpretive services to assist at discharge as needed  - Refer to Case Management Department for coordinating discharge planning if the patient needs post-hospital services based on physician/advanced practitioner order or complex needs related to functional status, cognitive ability, or social support system  Outcome: Completed     Discharge planning discussed with pt and pt's daughters  OP psych appt scheduled  Referral for OP therapy and ICM services made on pt's behalf  [employer] transportation arranged -- 11am   Med scripts should be sent to preferred pharmacy  List of recreational activities/groups provided  IMM letter signed

## 2021-06-01 ENCOUNTER — HOSPITAL ENCOUNTER (EMERGENCY)
Facility: HOSPITAL | Age: 66
Discharge: HOME | End: 2021-06-01
Attending: EMERGENCY MEDICINE
Payer: COMMERCIAL

## 2021-06-01 VITALS
RESPIRATION RATE: 16 BRPM | OXYGEN SATURATION: 99 % | WEIGHT: 216.05 LBS | DIASTOLIC BLOOD PRESSURE: 84 MMHG | BODY MASS INDEX: 39.52 KG/M2 | HEART RATE: 68 BPM | SYSTOLIC BLOOD PRESSURE: 142 MMHG | TEMPERATURE: 98 F

## 2021-06-01 DIAGNOSIS — Z86.59 HISTORY OF SCHIZOPHRENIA: ICD-10-CM

## 2021-06-01 DIAGNOSIS — F22 PARANOIA (HCC): Primary | ICD-10-CM

## 2021-06-01 LAB
ALBUMIN SERPL BCP-MCNC: 3.7 G/DL (ref 3.5–5)
ALP SERPL-CCNC: 136 U/L (ref 46–116)
ALT SERPL W P-5'-P-CCNC: 21 U/L (ref 12–78)
AMPHETAMINES SERPL QL SCN: NEGATIVE
ANION GAP SERPL CALCULATED.3IONS-SCNC: 11 MMOL/L (ref 4–13)
AST SERPL W P-5'-P-CCNC: 19 U/L (ref 5–45)
BACTERIA UR QL AUTO: ABNORMAL /HPF
BARBITURATES UR QL: NEGATIVE
BASOPHILS # BLD AUTO: 0.03 THOUSANDS/ΜL (ref 0–0.1)
BASOPHILS NFR BLD AUTO: 1 % (ref 0–1)
BENZODIAZ UR QL: NEGATIVE
BILIRUB SERPL-MCNC: 0.24 MG/DL (ref 0.2–1)
BILIRUB UR QL STRIP: NEGATIVE
BUN SERPL-MCNC: 10 MG/DL (ref 5–25)
CALCIUM SERPL-MCNC: 9.1 MG/DL (ref 8.3–10.1)
CHLORIDE SERPL-SCNC: 102 MMOL/L (ref 100–108)
CLARITY UR: ABNORMAL
CO2 SERPL-SCNC: 26 MMOL/L (ref 21–32)
COCAINE UR QL: NEGATIVE
COLOR UR: YELLOW
CREAT SERPL-MCNC: 0.51 MG/DL (ref 0.6–1.3)
EOSINOPHIL # BLD AUTO: 0.06 THOUSAND/ΜL (ref 0–0.61)
EOSINOPHIL NFR BLD AUTO: 1 % (ref 0–6)
ERYTHROCYTE [DISTWIDTH] IN BLOOD BY AUTOMATED COUNT: 13.4 % (ref 11.6–15.1)
ETHANOL EXG-MCNC: 0 MG/DL
GFR SERPL CREATININE-BSD FRML MDRD: 103 ML/MIN/1.73SQ M
GLUCOSE SERPL-MCNC: 84 MG/DL (ref 65–140)
GLUCOSE UR STRIP-MCNC: NEGATIVE MG/DL
HCT VFR BLD AUTO: 41.2 % (ref 34.8–46.1)
HGB BLD-MCNC: 13 G/DL (ref 11.5–15.4)
HGB UR QL STRIP.AUTO: ABNORMAL
IMM GRANULOCYTES # BLD AUTO: 0.03 THOUSAND/UL (ref 0–0.2)
IMM GRANULOCYTES NFR BLD AUTO: 1 % (ref 0–2)
KETONES UR STRIP-MCNC: NEGATIVE MG/DL
LEUKOCYTE ESTERASE UR QL STRIP: ABNORMAL
LYMPHOCYTES # BLD AUTO: 1.49 THOUSANDS/ΜL (ref 0.6–4.47)
LYMPHOCYTES NFR BLD AUTO: 23 % (ref 14–44)
MCH RBC QN AUTO: 27.1 PG (ref 26.8–34.3)
MCHC RBC AUTO-ENTMCNC: 31.6 G/DL (ref 31.4–37.4)
MCV RBC AUTO: 86 FL (ref 82–98)
METHADONE UR QL: NEGATIVE
MONOCYTES # BLD AUTO: 0.54 THOUSAND/ΜL (ref 0.17–1.22)
MONOCYTES NFR BLD AUTO: 8 % (ref 4–12)
NEUTROPHILS # BLD AUTO: 4.44 THOUSANDS/ΜL (ref 1.85–7.62)
NEUTS SEG NFR BLD AUTO: 66 % (ref 43–75)
NITRITE UR QL STRIP: NEGATIVE
NON-SQ EPI CELLS URNS QL MICRO: ABNORMAL /HPF
NRBC BLD AUTO-RTO: 0 /100 WBCS
OPIATES UR QL SCN: NEGATIVE
OXYCODONE+OXYMORPHONE UR QL SCN: NEGATIVE
PCP UR QL: NEGATIVE
PH UR STRIP.AUTO: 6.5 [PH] (ref 4.5–8)
PLATELET # BLD AUTO: 307 THOUSANDS/UL (ref 149–390)
PMV BLD AUTO: 10.4 FL (ref 8.9–12.7)
POTASSIUM SERPL-SCNC: 3.9 MMOL/L (ref 3.5–5.3)
PROT SERPL-MCNC: 7.8 G/DL (ref 6.4–8.2)
PROT UR STRIP-MCNC: NEGATIVE MG/DL
RBC # BLD AUTO: 4.79 MILLION/UL (ref 3.81–5.12)
RBC #/AREA URNS AUTO: ABNORMAL /HPF
SARS-COV-2 RNA RESP QL NAA+PROBE: NEGATIVE
SODIUM SERPL-SCNC: 139 MMOL/L (ref 136–145)
SP GR UR STRIP.AUTO: <=1.005 (ref 1–1.03)
THC UR QL: NEGATIVE
TSH SERPL DL<=0.05 MIU/L-ACNC: 1.24 UIU/ML (ref 0.36–3.74)
UROBILINOGEN UR QL STRIP.AUTO: 0.2 E.U./DL
WBC # BLD AUTO: 6.59 THOUSAND/UL (ref 4.31–10.16)
WBC #/AREA URNS AUTO: ABNORMAL /HPF

## 2021-06-01 PROCEDURE — 87086 URINE CULTURE/COLONY COUNT: CPT

## 2021-06-01 PROCEDURE — 84443 ASSAY THYROID STIM HORMONE: CPT | Performed by: EMERGENCY MEDICINE

## 2021-06-01 PROCEDURE — 82075 ASSAY OF BREATH ETHANOL: CPT | Performed by: EMERGENCY MEDICINE

## 2021-06-01 PROCEDURE — 80053 COMPREHEN METABOLIC PANEL: CPT | Performed by: EMERGENCY MEDICINE

## 2021-06-01 PROCEDURE — U0005 INFEC AGEN DETEC AMPLI PROBE: HCPCS | Performed by: EMERGENCY MEDICINE

## 2021-06-01 PROCEDURE — 99285 EMERGENCY DEPT VISIT HI MDM: CPT | Performed by: EMERGENCY MEDICINE

## 2021-06-01 PROCEDURE — 36415 COLL VENOUS BLD VENIPUNCTURE: CPT | Performed by: EMERGENCY MEDICINE

## 2021-06-01 PROCEDURE — 85025 COMPLETE CBC W/AUTO DIFF WBC: CPT | Performed by: EMERGENCY MEDICINE

## 2021-06-01 PROCEDURE — 80307 DRUG TEST PRSMV CHEM ANLYZR: CPT | Performed by: EMERGENCY MEDICINE

## 2021-06-01 PROCEDURE — 81001 URINALYSIS AUTO W/SCOPE: CPT

## 2021-06-01 PROCEDURE — 99285 EMERGENCY DEPT VISIT HI MDM: CPT

## 2021-06-01 PROCEDURE — U0003 INFECTIOUS AGENT DETECTION BY NUCLEIC ACID (DNA OR RNA); SEVERE ACUTE RESPIRATORY SYNDROME CORONAVIRUS 2 (SARS-COV-2) (CORONAVIRUS DISEASE [COVID-19]), AMPLIFIED PROBE TECHNIQUE, MAKING USE OF HIGH THROUGHPUT TECHNOLOGIES AS DESCRIBED BY CMS-2020-01-R: HCPCS | Performed by: EMERGENCY MEDICINE

## 2021-06-01 PROCEDURE — 93005 ELECTROCARDIOGRAM TRACING: CPT

## 2021-06-01 RX ORDER — QUETIAPINE FUMARATE 100 MG/1
100 TABLET, FILM COATED ORAL
Status: DISCONTINUED | OUTPATIENT
Start: 2021-06-01 | End: 2021-06-02 | Stop reason: HOSPADM

## 2021-06-01 NOTE — ED NOTES
Pt transferred to safe area at this time  Pt ambulatory with even and steady gait  Pt awaiting crisis evaluation at this time and then will proceed with changing patient into behavioral health attire or not         Author RAJINDER Rubi  06/01/21 6051

## 2021-06-01 NOTE — ED PROVIDER NOTES
History  Chief Complaint   Patient presents with    Paranoia     Pt arrives from home reporting increased paranoia over the past few days  Pt states she has not been taking her medications because "I'm scared to fall asleep"  Denies HI/SI/hallucinations  63 y o  F w/h/o bipolar disorder, psychosis p/w "a problem" x "awhile "  Having stress and panic attacks  Thinks someone is coming after her, but doesn't know who they are  Doesn't feel safe at home  Reports she is waking up with needle marks  She isn't taking her meds because she's afraid of going to sleep  Denies SI/HI  History provided by:  Patient   used: No    Psychiatric Evaluation  Presenting symptoms: no homicidal ideas and no suicidal thoughts    Duration: "awhile"  Chronicity:  New  Context: noncompliance    Relieved by:  None tried  Worsened by:  Nothing  Ineffective treatments:  None tried  Associated symptoms: anxiety    Associated symptoms: no abdominal pain, no chest pain and no headaches    Risk factors: hx of mental illness        Prior to Admission Medications   Prescriptions Last Dose Informant Patient Reported? Taking? QUEtiapine (SEROquel) 100 mg tablet Past Week at Unknown time  No Yes   Sig: Take 1 tablet (100 mg total) by mouth daily at bedtime   sertraline (ZOLOFT) 25 mg tablet 6/1/2021 at Unknown time  No Yes   Sig: Take 1 tablet (25 mg total) by mouth daily      Facility-Administered Medications: None       Past Medical History:   Diagnosis Date    Anxiety     Bipolar 1 disorder ([facility] ) 10/21/2020    Depression     Disease of thyroid gland     Hypertension     Panic attack     Psychiatric illness     Psychosis ([facility] )        Past Surgical History:   Procedure Laterality Date    HYSTERECTOMY      TUBAL LIGATION         History reviewed  No pertinent family history  I have reviewed and agree with the history as documented      E-Cigarette/Vaping     E-Cigarette/Vaping Substances     Social History Tobacco Use    Smoking status: Current Every Day Smoker     Packs/day: 1 00    Smokeless tobacco: Never Used   Substance Use Topics    Alcohol use: Never     Frequency: Never    Drug use: Never       Review of Systems   Respiratory: Negative for chest tightness  Cardiovascular: Negative for chest pain  Gastrointestinal: Negative for abdominal pain, diarrhea, nausea and vomiting  Neurological: Negative for headaches  Psychiatric/Behavioral: Negative for homicidal ideas and suicidal ideas  The patient is nervous/anxious  All other systems reviewed and are negative  Physical Exam  Physical Exam  Vitals signs and nursing note reviewed  Constitutional:       General: She is not in acute distress  Appearance: She is well-developed  She is not ill-appearing, toxic-appearing or diaphoretic  HENT:      Head: Normocephalic and atraumatic  Eyes:      General: No scleral icterus  Conjunctiva/sclera:      Right eye: Right conjunctiva is not injected  Left eye: Left conjunctiva is not injected  Neck:      Musculoskeletal: Normal range of motion  Vascular: No JVD  Trachea: Trachea normal    Cardiovascular:      Rate and Rhythm: Normal rate and regular rhythm  Pulses: Normal pulses  Heart sounds: Normal heart sounds  No murmur  No friction rub  Pulmonary:      Effort: Pulmonary effort is normal  No accessory muscle usage or respiratory distress  Breath sounds: Normal breath sounds  No stridor  No wheezing, rhonchi or rales  Chest:      Chest wall: No tenderness  Abdominal:      General: There is no distension  Palpations: Abdomen is soft  Tenderness: There is no abdominal tenderness  There is no guarding or rebound  Skin:     General: Skin is warm and dry  Coloration: Skin is not pale  Findings: No rash  Neurological:      Mental Status: She is alert  GCS: GCS eye subscore is 4  GCS verbal subscore is 5   GCS motor subscore is 6    Psychiatric:         Behavior: Behavior is not agitated, aggressive or hyperactive  Thought Content: Thought content is paranoid  Thought content does not include homicidal or suicidal ideation  Vital Signs  ED Triage Vitals [06/01/21 1706]   Temperature Pulse Respirations Blood Pressure SpO2   98 1 °F (36 7 °C) 70 16 (!) 175/74 98 %      Temp Source Heart Rate Source Patient Position - Orthostatic VS BP Location FiO2 (%)   Oral Monitor Lying Right arm --      Pain Score       No Pain           Vitals:    06/01/21 1706 06/01/21 1817   BP: (!) 175/74 126/68   Pulse: 70 65   Patient Position - Orthostatic VS: Lying          Visual Acuity      ED Medications  Medications   QUEtiapine (SEROquel) tablet 100 mg (has no administration in time range)   sertraline (ZOLOFT) tablet 50 mg (has no administration in time range)       Diagnostic Studies  Results Reviewed     Procedure Component Value Units Date/Time    Novel Coronavirus (Covid-19),PCR [facility] - 2 Hour Stat [[id number]]  (Normal) Collected: 06/01/21 1734    Lab Status: Final result Specimen: Nares from Nasopharyngeal Swab Updated: 06/01/21 1843     SARS-CoV-2 Negative    Narrative: The specimen collection materials, transport medium, and/or testing methodology utilized in the production of these test results have been proven to be reliable in a limited validation with an abbreviated program under the Emergency Utilization Authorization provided by the FDA  Testing reported as "Presumptive positive" will be confirmed with secondary testing to ensure result accuracy  Clinical caution and judgement should be used with the interpretation of these results with consideration of the clinical impression and other laboratory testing  Testing reported as "Positive" or "Negative" has been proven to be accurate according to standard laboratory validation requirements    All testing is performed with control materials showing appropriate reactivity at standard intervals  TSH, 3rd generation with Free T4 reflex [[id number]]  (Normal) Collected: 06/01/21 1734    Lab Status: Final result Specimen: Blood from Arm, Right Updated: 06/01/21 1814     TSH 3RD GENERATON 1 242 uIU/mL     Narrative:      Patients undergoing fluorescein dye angiography may retain small amounts of fluorescein in the body for 48-72 hours post procedure  Samples containing fluorescein can produce falsely depressed TSH values  If the patient had this procedure,a specimen should be resubmitted post fluorescein clearance        Comprehensive metabolic panel [[id number]]  (Abnormal) Collected: 06/01/21 1734    Lab Status: Final result Specimen: Blood from Arm, Right Updated: 06/01/21 1806     Sodium 139 mmol/L      Potassium 3 9 mmol/L      Chloride 102 mmol/L      CO2 26 mmol/L      ANION GAP 11 mmol/L      BUN 10 mg/dL      Creatinine 0 51 mg/dL      Glucose 84 mg/dL      Calcium 9 1 mg/dL      AST 19 U/L      ALT 21 U/L      Alkaline Phosphatase 136 U/L      Total Protein 7 8 g/dL      Albumin 3 7 g/dL      Total Bilirubin 0 24 mg/dL      eGFR 103 ml/min/1 73sq m     Narrative:      [place] guidelines for Chronic Kidney Disease (CKD):     Stage 1 with normal or high GFR (GFR > 90 mL/min/1 73 square meters)    Stage 2 Mild CKD (GFR = 60-89 mL/min/1 73 square meters)    Stage 3A Moderate CKD (GFR = 45-59 mL/min/1 73 square meters)    Stage 3B Moderate CKD (GFR = 30-44 mL/min/1 73 square meters)    Stage 4 Severe CKD (GFR = 15-29 mL/min/1 73 square meters)    Stage 5 End Stage CKD (GFR <15 mL/min/1 73 square meters)  Note: GFR calculation is accurate only with a steady state creatinine    POCT alcohol breath test [[id number]]  (Normal) Resulted: 06/01/21 1748    Lab Status: Final result Updated: 06/01/21 1748     EXTBreath Alcohol 0 00    CBC and differential [[id number]] Collected: 06/01/21 1734    Lab Status: Final result Specimen: Blood from Arm, Right Updated: 06/01/21 1743     WBC 6 59 Thousand/uL      RBC 4 79 Million/uL      Hemoglobin 13 0 g/dL      Hematocrit 41 2 %      MCV 86 fL      MCH 27 1 pg      MCHC 31 6 g/dL      RDW 13 4 %      MPV 10 4 fL      Platelets 702 Thousands/uL      nRBC 0 /100 WBCs      Neutrophils Relative 66 %      Immat GRANS % 1 %      Lymphocytes Relative 23 %      Monocytes Relative 8 %      Eosinophils Relative 1 %      Basophils Relative 1 %      Neutrophils Absolute 4 44 Thousands/µL      Immature Grans Absolute 0 03 Thousand/uL      Lymphocytes Absolute 1 49 Thousands/µL      Monocytes Absolute 0 54 Thousand/µL      Eosinophils Absolute 0 06 Thousand/µL      Basophils Absolute 0 03 Thousands/µL     Rapid drug screen, urine [[id number]]     Lab Status: No result Specimen: Urine                  No orders to display              Procedures  ECG 12 Lead Documentation Only    Date/Time: 6/1/2021 6:07 PM  Performed by: Dejon Phelan DO  Authorized by: Dejon Phelan DO     Indications / Diagnosis:  Psych eval  ECG reviewed by me, the ED Provider: yes    Patient location:  Bedside  Previous ECG:     Previous ECG:  Compared to current    Comparison ECG info:  4/29/21  Rate:     ECG rate:  61    ECG rate assessment: normal    Rhythm:     Rhythm: sinus rhythm    Ectopy:     Ectopy: none    QRS:     QRS intervals:  Normal  ST segments:     ST segments:  Normal             ED Course  ED Course as of Jun 01 1903   Tue Jun 01, 2021   [facility] ordered  1903 Pt medically cleared for psych eval                                 SBIRT 20yo+      Most Recent Value   SBIRT (22 yo +)   In order to provide better care to our patients, we are screening all of our patients for alcohol and drug use  Would it be okay to ask you these screening questions?   No Filed at: 06/01/2021 1709                    MDM    Disposition  Final diagnoses:   Paranoia ([facility] )   History of schizophrenia     Time reflects when diagnosis was documented in both MDM as applicable and the Disposition within this note     Time User Action Codes Description Comment    6/1/2021  5:38 PM Robbie Talbot [F22] Paranoia ([address] )     6/1/2021  5:38 PM Polly Frankel [Z86 59] History of schizophrenia       ED Disposition     None      Follow-up Information    None         Patient's Medications   Discharge Prescriptions    No medications on file     No discharge procedures on file      PDMP Review     None          ED Provider  Electronically Signed by           Lillie Martin DO  06/01/21 8111 DEBBIE Clifton DO  06/01/21 0941

## 2021-06-01 NOTE — ED NOTES
Pt reporting that where she resides she is afraid to go home and that she will feel paranoid once again when returning home  Pt denies SI/HI/AH/VH  States she is feeling fine at this time and has no complaints  Pt aware that she is awaiting crisis consult and may need to change depending on evaluation and verbalized understanding  At this time pt is resting on stretcher with normal and nonlabored respirations watching television  Crisis is aware of consult        Charlie Biggs RN  06/01/21 3286

## 2021-06-02 LAB
ATRIAL RATE: 61 BPM
BACTERIA UR CULT: NORMAL
P AXIS: 68 DEGREES
PR INTERVAL: 208 MS
QRS AXIS: 93 DEGREES
QRSD INTERVAL: 100 MS
QT INTERVAL: 408 MS
QTC INTERVAL: 410 MS
T WAVE AXIS: 91 DEGREES
VENTRICULAR RATE: 61 BPM

## 2021-06-02 PROCEDURE — 93010 ELECTROCARDIOGRAM REPORT: CPT | Performed by: INTERNAL MEDICINE

## 2021-06-02 NOTE — ED NOTES
See paper chart for ED behavioral health observation record  Will maintain 4 random checks per hour for patient safety while awaiting crisis evaluation        Mindy Ritchie RN  06/01/21 2004

## 2021-06-02 NOTE — ED CARE HANDOFF
Emergency Department Sign Out Note        Sign out and transfer of care from Dr Alyce Leone  See Separate Emergency Department note  The patient, Korin Leigh, was evaluated by the previous provider for Dr Alyce Leone  Workup Completed:  Labs, Urine  ED Course / Workup Pending (followup):  Please see note below  ED Course as of Jun 02 0020 Tue Jun 01, 2021 2133 Patient received in sign-out for follow-up of crisis evaluation, seen by Crisis worker, no SI/HI, no criteria for 302, patient does not want to sign 201, wants to go home, has follow up with her Psych; advised RTED for worsening symptoms  2143 WBC, UA(!): Innumerable   2147 Urine was noted for innumerable WBC, however patient denies any dysuria or urinary symptoms, will hold off on antibiotics until urine culture results  Patient okay with the plan  Bacteria, UA: Occasional     Procedures  MDM    Disposition  Final diagnoses:   Paranoia ([address] )   History of schizophrenia     Time reflects when diagnosis was documented in both MDM as applicable and the Disposition within this note     Time User Action Codes Description Comment    6/1/2021  5:38 PM Brenna Levels [F22] Paranoia ([address] )     6/1/2021  5:38 PM Polly Frankel Add [Z86 59] History of schizophrenia       ED Disposition     ED Disposition Condition Date/Time Comment    Discharge Stable Tue Jun 1, 2021  9:51 PM [place] discharge to home/self care  MD Documentation      Most Recent Value   Sending MD Dr Greg Ayala up With Specialties Details Why Contact Info    Ricardo Canseco,  Family Medicine Schedule an appointment as soon as possible for a visit   [address] [place] [address] AS GIVEN TO YOU BY [address] [place] [address]          Discharge Medication List as of 6/1/2021  9:52 PM      CONTINUE these medications which have NOT CHANGED    Details QUEtiapine (SEROquel) 100 mg tablet Take 1 tablet (100 mg total) by mouth daily at bedtime, Starting Wed 5/5/2021, Until Fri 6/4/2021, Normal      sertraline (ZOLOFT) 25 mg tablet Take 1 tablet (25 mg total) by mouth daily, Starting Thu 5/6/2021, Until Sat 6/5/2021, Normal           No discharge procedures on file         ED Provider  Electronically Signed by     Antony Mcgraw MD  06/02/21 5214

## 2021-06-02 NOTE — ED NOTES
Patent is a 64 YO F with past history of schizoaffective d/o and depression who was brought in via EMS due to c/o paranoia and delusional thinking  On exam patient is calm  She reported her mother passed way three years ago and she now lives alone  Patient is paranoid that someone, possibly her neighbors, are after her  She reported she hears them talking outside of her door of her home  States she has been so fearful and scared of these people that she has not been sleeping and has stopped taking her Zoloft and Seroquel x 2 days  Does not hear said people when not in her home  Presently, she denied AH/VH/SI/HI  Does not report any changes with her eating or appetite  Does not endorse any depression  Patient is not floridly psychotic and appears to be caring for herself  No overt anxiety  Denies D&A use  [facility] is following for psychiatry care  States she had an appointment with her psychiatrist last week  Patient was admitted at [facility] from 4/28-5/5/2021  She reported to feeling better since being in the ED, at her baseline, and does not want to sign herself in for inpatient admission  Patient will f/u with [facility], continue her medication regimen, and will go to the neariest ED if her symptoms worsen  EDMD Ameena Duckworth agrees with this plan  Chief Complaint   Patient presents with    Paranoia     Pt arrives from home reporting increased paranoia over the past few days  Pt states she has not been taking her medications because "I'm scared to fall asleep"  Denies HI/SI/hallucinations  Crisis intake assessment completed, safety risk assessment completed

## 2021-06-02 NOTE — DISCHARGE INSTRUCTIONS
Continue f/u with [facility] as scheduled and taking current medication regimen  Return to the closest ED if you symptoms worsen

## 2021-06-09 ENCOUNTER — HOSPITAL ENCOUNTER (EMERGENCY)
Facility: HOSPITAL | Age: 66
Discharge: HOME | End: 2021-06-09
Attending: EMERGENCY MEDICINE | Admitting: EMERGENCY MEDICINE
Payer: COMMERCIAL

## 2021-06-09 ENCOUNTER — APPOINTMENT (EMERGENCY)
Dept: CT IMAGING | Facility: HOSPITAL | Age: 66
End: 2021-06-09
Payer: COMMERCIAL

## 2021-06-09 VITALS
TEMPERATURE: 97.6 F | HEART RATE: 60 BPM | DIASTOLIC BLOOD PRESSURE: 65 MMHG | RESPIRATION RATE: 18 BRPM | OXYGEN SATURATION: 98 % | SYSTOLIC BLOOD PRESSURE: 147 MMHG

## 2021-06-09 DIAGNOSIS — K63.89 COLON DISTENTION: ICD-10-CM

## 2021-06-09 DIAGNOSIS — R93.5 ABNORMAL CT OF THE ABDOMEN: Primary | ICD-10-CM

## 2021-06-09 DIAGNOSIS — K86.2 CYSTIC MASS OF PANCREAS: ICD-10-CM

## 2021-06-09 LAB
ANION GAP SERPL CALCULATED.3IONS-SCNC: 13 MMOL/L (ref 4–13)
BACTERIA UR QL AUTO: ABNORMAL /HPF
BASOPHILS # BLD AUTO: 0.03 THOUSANDS/ΜL (ref 0–0.1)
BASOPHILS NFR BLD AUTO: 1 % (ref 0–1)
BILIRUB UR QL STRIP: NEGATIVE
BUN SERPL-MCNC: 6 MG/DL (ref 5–25)
CALCIUM SERPL-MCNC: 9.1 MG/DL (ref 8.3–10.1)
CHLORIDE SERPL-SCNC: 101 MMOL/L (ref 100–108)
CLARITY UR: CLEAR
CO2 SERPL-SCNC: 24 MMOL/L (ref 21–32)
COLOR UR: YELLOW
CREAT SERPL-MCNC: 0.53 MG/DL (ref 0.6–1.3)
EOSINOPHIL # BLD AUTO: 0.08 THOUSAND/ΜL (ref 0–0.61)
EOSINOPHIL NFR BLD AUTO: 1 % (ref 0–6)
ERYTHROCYTE [DISTWIDTH] IN BLOOD BY AUTOMATED COUNT: 13.3 % (ref 11.6–15.1)
GFR SERPL CREATININE-BSD FRML MDRD: 101 ML/MIN/1.73SQ M
GLUCOSE SERPL-MCNC: 74 MG/DL (ref 65–140)
GLUCOSE UR STRIP-MCNC: NEGATIVE MG/DL
HCT VFR BLD AUTO: 40.3 % (ref 34.8–46.1)
HGB BLD-MCNC: 13 G/DL (ref 11.5–15.4)
HGB UR QL STRIP.AUTO: ABNORMAL
IMM GRANULOCYTES # BLD AUTO: 0.02 THOUSAND/UL (ref 0–0.2)
IMM GRANULOCYTES NFR BLD AUTO: 0 % (ref 0–2)
KETONES UR STRIP-MCNC: ABNORMAL MG/DL
LEUKOCYTE ESTERASE UR QL STRIP: ABNORMAL
LYMPHOCYTES # BLD AUTO: 1.34 THOUSANDS/ΜL (ref 0.6–4.47)
LYMPHOCYTES NFR BLD AUTO: 24 % (ref 14–44)
MCH RBC QN AUTO: 27.5 PG (ref 26.8–34.3)
MCHC RBC AUTO-ENTMCNC: 32.3 G/DL (ref 31.4–37.4)
MCV RBC AUTO: 85 FL (ref 82–98)
MONOCYTES # BLD AUTO: 0.43 THOUSAND/ΜL (ref 0.17–1.22)
MONOCYTES NFR BLD AUTO: 8 % (ref 4–12)
NEUTROPHILS # BLD AUTO: 3.7 THOUSANDS/ΜL (ref 1.85–7.62)
NEUTS SEG NFR BLD AUTO: 66 % (ref 43–75)
NITRITE UR QL STRIP: NEGATIVE
NON-SQ EPI CELLS URNS QL MICRO: ABNORMAL /HPF
NRBC BLD AUTO-RTO: 0 /100 WBCS
PH UR STRIP.AUTO: 7 [PH] (ref 4.5–8)
PLATELET # BLD AUTO: 308 THOUSANDS/UL (ref 149–390)
PMV BLD AUTO: 10.3 FL (ref 8.9–12.7)
POTASSIUM SERPL-SCNC: 3.3 MMOL/L (ref 3.5–5.3)
PROT UR STRIP-MCNC: NEGATIVE MG/DL
RBC # BLD AUTO: 4.73 MILLION/UL (ref 3.81–5.12)
RBC #/AREA URNS AUTO: ABNORMAL /HPF
SODIUM SERPL-SCNC: 138 MMOL/L (ref 136–145)
SP GR UR STRIP.AUTO: 1.01 (ref 1–1.03)
TSH SERPL DL<=0.05 MIU/L-ACNC: 0.71 UIU/ML (ref 0.36–3.74)
UROBILINOGEN UR QL STRIP.AUTO: 0.2 E.U./DL
WBC # BLD AUTO: 5.6 THOUSAND/UL (ref 4.31–10.16)
WBC #/AREA URNS AUTO: ABNORMAL /HPF

## 2021-06-09 PROCEDURE — 81001 URINALYSIS AUTO W/SCOPE: CPT

## 2021-06-09 PROCEDURE — 80048 BASIC METABOLIC PNL TOTAL CA: CPT | Performed by: PHYSICIAN ASSISTANT

## 2021-06-09 PROCEDURE — 99284 EMERGENCY DEPT VISIT MOD MDM: CPT | Performed by: PHYSICIAN ASSISTANT

## 2021-06-09 PROCEDURE — 36415 COLL VENOUS BLD VENIPUNCTURE: CPT | Performed by: PHYSICIAN ASSISTANT

## 2021-06-09 PROCEDURE — 84443 ASSAY THYROID STIM HORMONE: CPT | Performed by: PHYSICIAN ASSISTANT

## 2021-06-09 PROCEDURE — 74177 CT ABD & PELVIS W/CONTRAST: CPT

## 2021-06-09 PROCEDURE — 99284 EMERGENCY DEPT VISIT MOD MDM: CPT

## 2021-06-09 PROCEDURE — 85025 COMPLETE CBC W/AUTO DIFF WBC: CPT | Performed by: PHYSICIAN ASSISTANT

## 2021-06-09 PROCEDURE — 87086 URINE CULTURE/COLONY COUNT: CPT

## 2021-06-09 RX ORDER — POLYETHYLENE GLYCOL 3350 17 G/17G
17 POWDER, FOR SOLUTION ORAL DAILY
Qty: 119 G | Refills: 0 | Status: SHIPPED | OUTPATIENT
Start: 2021-06-09 | End: 2021-06-16

## 2021-06-09 RX ORDER — DIVALPROEX SODIUM 500 MG/1
500 TABLET, EXTENDED RELEASE ORAL 2 TIMES DAILY
COMMUNITY

## 2021-06-09 RX ORDER — DOCUSATE SODIUM 100 MG/1
100 CAPSULE, LIQUID FILLED ORAL EVERY 12 HOURS
Qty: 60 CAPSULE | Refills: 0 | Status: SHIPPED | OUTPATIENT
Start: 2021-06-09

## 2021-06-09 RX ADMIN — IOHEXOL 100 ML: 350 INJECTION, SOLUTION INTRAVENOUS at 18:19

## 2021-06-09 NOTE — DISCHARGE INSTRUCTIONS
You will need repeat imaging in 6 months for cyst on pancreas seen on CT today  Take medications as prescribed

## 2021-06-09 NOTE — ED PROVIDER NOTES
History  Chief Complaint   Patient presents with    Constipation     patient states she had been constipated for 3 weeks  denies attempting to try medications  c/o loss of appetite  53-year-old female with history of bipolar, depression, anxiety presents emergency department for evaluation of constipation, lower abdominal pain decreased appetite  Patient reports her last normal bowel movement was approximately 3 weeks ago, denies any blood  Patient reports this is a baseline problem for her, was supposed to see GI, but has not seen them yet  She denies taking anything for it  She denies any fevers, chest pain, shortness of breath, vomiting, diarrhea  She has a history of cholecystectomy and hysterectomy  Patient has a psychiatric history, but denies any SI/HI/AH/VH  History provided by:  Patient   used: No    Constipation  Severity:  Moderate  Time since last bowel movement:  3 weeks  Timing:  Constant  Progression:  Worsening  Chronicity:  Chronic  Context: not dehydration, not dietary changes, not medication and not narcotics    Stool description:  None produced  Unusual stool frequency:  2/week  Relieved by:  None tried  Worsened by:  Nothing  Ineffective treatments:  None tried  Associated symptoms: abdominal pain and anorexia    Associated symptoms: no back pain, no diarrhea, no dysuria, no fever, no nausea, no urinary retention and no vomiting    Risk factors: hx of abdominal surgery and obesity    Risk factors: no recent antibiotic use and no recent surgery        Prior to Admission Medications   Prescriptions Last Dose Informant Patient Reported? Taking?    QUEtiapine (SEROquel) 100 mg tablet   No Yes   Sig: Take 1 tablet (100 mg total) by mouth daily at bedtime   divalproex sodium (DEPAKOTE ER) 500 mg 24 hr tablet   Yes Yes   Sig: Take 500 mg by mouth 2 (two) times a day   sertraline (ZOLOFT) 25 mg tablet   No Yes   Sig: Take 1 tablet (25 mg total) by mouth daily Facility-Administered Medications: None       Past Medical History:   Diagnosis Date    Anxiety     Bipolar 1 disorder ([facility] [address] ) 10/21/2020    Depression     Disease of thyroid gland     Hypertension     Panic attack     Psychiatric illness     Psychosis [facility])        Past Surgical History:   Procedure Laterality Date    HYSTERECTOMY      TUBAL LIGATION         History reviewed  No pertinent family history  I have reviewed and agree with the history as documented  E-Cigarette/Vaping     E-Cigarette/Vaping Substances     Social History     Tobacco Use    Smoking status: Current Every Day Smoker     Packs/day: 1 00    Smokeless tobacco: Never Used   Substance Use Topics    Alcohol use: Never     Frequency: Never    Drug use: Never       Review of Systems   Constitutional: Negative for chills, fever and unexpected weight change  Respiratory: Negative for chest tightness and shortness of breath  Cardiovascular: Negative for chest pain  Gastrointestinal: Positive for abdominal pain, anorexia and constipation  Negative for diarrhea, nausea and vomiting  Endocrine: Negative for heat intolerance  Genitourinary: Negative for dysuria  Musculoskeletal: Negative for back pain  All other systems reviewed and are negative  Physical Exam  Physical Exam  Vitals signs and nursing note reviewed  Constitutional:       General: She is not in acute distress  Appearance: She is well-developed  She is not ill-appearing or toxic-appearing  HENT:      Head: Normocephalic and atraumatic  Right Ear: Hearing and external ear normal       Left Ear: Hearing and external ear normal       Nose: Nose normal       Mouth/Throat:      Lips: Pink  No lesions  Mouth: Mucous membranes are moist    Eyes:      Conjunctiva/sclera: Conjunctivae normal    Neck:      Musculoskeletal: Full passive range of motion without pain and neck supple     Cardiovascular:      Rate and Rhythm: Normal rate and regular rhythm  Heart sounds: Normal heart sounds, S1 normal and S2 normal  No murmur  Pulmonary:      Effort: Pulmonary effort is normal  No tachypnea, respiratory distress or retractions  Breath sounds: Normal breath sounds  No decreased breath sounds, wheezing, rhonchi or rales  Abdominal:      General: Bowel sounds are normal  There is distension  Palpations: Abdomen is soft  There is no fluid wave  Tenderness: There is abdominal tenderness in the right lower quadrant, suprapubic area and left lower quadrant  There is no right CVA tenderness, left CVA tenderness, guarding or rebound  Musculoskeletal:      Comments: Normal range of motion; no edema, tenderness, or deformities  Skin:     General: Skin is warm and dry  Findings: No rash or wound  Neurological:      General: No focal deficit present  Mental Status: She is alert and oriented to person, place, and time  GCS: GCS eye subscore is 4  GCS verbal subscore is 5  GCS motor subscore is 6  Psychiatric:         Attention and Perception: She does not perceive auditory or visual hallucinations           Mood and Affect: Mood normal          Speech: Speech normal          Behavior: Behavior normal          Vital Signs  ED Triage Vitals   Temperature Pulse Respirations Blood Pressure SpO2   06/09/21 1535 06/09/21 1535 06/09/21 1535 06/09/21 1535 06/09/21 1535   97 6 °F (36 4 °C) 69 18 122/59 99 %      Temp Source Heart Rate Source Patient Position - Orthostatic VS BP Location FiO2 (%)   06/09/21 1535 06/09/21 1535 06/09/21 1535 06/09/21 1535 --   Oral Monitor Lying Right arm       Pain Score       06/09/21 1744       No Pain           Vitals:    06/09/21 1535 06/09/21 1744   BP: 122/59 147/65   Pulse: 69 60   Patient Position - Orthostatic VS: Lying Lying         Visual Acuity      ED Medications  Medications   iohexol (OMNIPAQUE) 350 MG/ML injection (SINGLE-DOSE) 100 mL (100 mL Intravenous Given 6/9/21 1819)       Diagnostic Studies  Results Reviewed     Procedure Component Value Units Date/Time    Urine culture [[id number]] Collected: 06/09/21 1805    Lab Status: Final result Specimen: Urine, Clean Catch Updated: 06/10/21 1726     Urine Culture 50,000-59,000 cfu/ml     Urine Microscopic [[id number]]  (Abnormal) Collected: 06/09/21 1805    Lab Status: Final result Specimen: Urine, Clean Catch Updated: 06/09/21 1930     RBC, UA 1-2 /hpf      WBC, UA 10-20 /hpf      Epithelial Cells Occasional /hpf      Bacteria, UA Occasional /hpf     Urine Macroscopic, POC [[id number]]  (Abnormal) Collected: 06/09/21 1805    Lab Status: Final result Specimen: Urine Updated: 06/09/21 1806     Color, UA Yellow     Clarity, UA Clear     pH, UA 7 0     Leukocytes, UA Moderate     Nitrite, UA Negative     Protein, UA Negative mg/dl      Glucose, UA Negative mg/dl      Ketones, UA Trace mg/dl      Urobilinogen, UA 0 2 E U /dl      Bilirubin, UA Negative     Blood, UA Trace     Specific [place], UA 1 010    Narrative:      CLINITEK RESULT    Basic metabolic panel [[id number]]  (Abnormal) Collected: 06/09/21 1616    Lab Status: Final result Specimen: Blood from Arm, Right Updated: 06/09/21 1657     Sodium 138 mmol/L      Potassium 3 3 mmol/L      Chloride 101 mmol/L      CO2 24 mmol/L      ANION GAP 13 mmol/L      BUN 6 mg/dL      Creatinine 0 53 mg/dL      Glucose 74 mg/dL      Calcium 9 1 mg/dL      eGFR 101 ml/min/1 73sq m     Narrative:      [place] guidelines for Chronic Kidney Disease (CKD):     Stage 1 with normal or high GFR (GFR > 90 mL/min/1 73 square meters)    Stage 2 Mild CKD (GFR = 60-89 mL/min/1 73 square meters)    Stage 3A Moderate CKD (GFR = 45-59 mL/min/1 73 square meters)    Stage 3B Moderate CKD (GFR = 30-44 mL/min/1 73 square meters)    Stage 4 Severe CKD (GFR = 15-29 mL/min/1 73 square meters)    Stage 5 End Stage CKD (GFR <15 mL/min/1 73 square meters)  Note: GFR calculation is accurate only with a steady state creatinine    TSH [[id number]]  (Normal) Collected: 06/09/21 1616    Lab Status: Final result Specimen: Blood from Arm, Right Updated: 06/09/21 1657     TSH 3RD GENERATON 0 713 uIU/mL     Narrative:      Patients undergoing fluorescein dye angiography may retain small amounts of fluorescein in the body for 48-72 hours post procedure  Samples containing fluorescein can produce falsely depressed TSH values  If the patient had this procedure,a specimen should be resubmitted post fluorescein clearance  CBC and differential [[id number]] Collected: 06/09/21 1616    Lab Status: Final result Specimen: Blood from Arm, Right Updated: 06/09/21 1625     WBC 5 60 Thousand/uL      RBC 4 73 Million/uL      Hemoglobin 13 0 g/dL      Hematocrit 40 3 %      MCV 85 fL      MCH 27 5 pg      MCHC 32 3 g/dL      RDW 13 3 %      MPV 10 3 fL      Platelets 252 Thousands/uL      nRBC 0 /100 WBCs      Neutrophils Relative 66 %      Immat GRANS % 0 %      Lymphocytes Relative 24 %      Monocytes Relative 8 %      Eosinophils Relative 1 %      Basophils Relative 1 %      Neutrophils Absolute 3 70 Thousands/µL      Immature Grans Absolute 0 02 Thousand/uL      Lymphocytes Absolute 1 34 Thousands/µL      Monocytes Absolute 0 43 Thousand/µL      Eosinophils Absolute 0 08 Thousand/µL      Basophils Absolute 0 03 Thousands/µL                  CT abdomen pelvis with contrast   Final Result by Elsi Keen MD (06/09 1854)         1  Gaseous distention of the colon with no definite obstruction  Possible mild ileus  2   Incidental 1 7 cm cyst pancreatic head  Six-month follow-up recommended  The study was marked in EPIC for significant notification  Workstation performed: [id number]                    Procedures  Procedures         ED Course  ED Course as of Jun 12 1110 Wed Jun 09, 2021   1558 Patient seen and examined; will order labs, CT A/P  Patient denies any psych complaints        1627 WBC: 5 60   1627 Hemoglobin: 13 0 1627 HCT: 40 3   1627 Platelet Count: 012   1700 TSH 3RD GENERATON: 0 713   1700 Potassium(!): 3 3   1700 Creatinine(!): 0 53   1820 Leukocytes, UA(!): Moderate   1820 Nitrite, UA: Negative   1820 Ketones, UA(!): Trace   1820 Blood, UA(!): Trace   1858 IMPRESSION:        1  Gaseous distention of the colon with no definite obstruction  Possible mild ileus  2   Incidental 1 7 cm cyst pancreatic head  Six-month follow-up recommended  CT abdomen pelvis with contrast                             SBIRT 22yo+      Most Recent Value   SBIRT (23 yo +)   In order to provide better care to our patients, we are screening all of our patients for alcohol and drug use  Would it be okay to ask you these screening questions? Yes Filed at: 06/09/2021 1640   Initial Alcohol Screen: US AUDIT-C    1  How often do you have a drink containing alcohol?  0 Filed at: 06/09/2021 1640   2  How many drinks containing alcohol do you have on a typical day you are drinking? 0 Filed at: 06/09/2021 1640   3b  FEMALE Any Age, or MALE 65+: How often do you have 4 or more drinks on one occassion? 0 Filed at: 06/09/2021 1640   Audit-C Score  0 Filed at: 06/09/2021 1640   JEREMY: How many times in the past year have you    Used an illegal drug or used a prescription medication for non-medical reasons? Never Filed at: 06/09/2021 1640                    MDM  Number of Diagnoses or Management Options  Abnormal CT of the abdomen:   Colon distention:   Cystic mass of pancreas:   Diagnosis management comments: 57-year-old female with history of bipolar, depression, anxiety presents emergency department for evaluation of constipation, lower abdominal pain decreased appetite  CBC, BMP unremarkable  UA demonstrates UTI, patient is asymptomatic, will treat with Keflex  CT scan demonstrates colonic distension, with some stool burden, as well as an incidentally noted 1 7 cm cystic mass the pancreas    Counseled patient she will need outpatient follow-up for this  Patient verbalized understanding and all questions were answered  Will trial bowel regiment for symptoms  Differential diagnoses includes, but not limited to:  Diverticulitis, colonic mass, SBO, constipation  Abdominal exam without peritoneal signs  No evidence of acute abdomen at this time  Well appearing  Low suspicion for acute hepatobiliary disease, acute pancreatitis, PUD (including perforation), acute infectious process (pneumonia, hepatitis, pyelonephritis), acute appendicitis, vascular catastrophe, bowel obstruction or viscus perforation  Presentation not consistent with other acute, emergent causes of abdominal pain at this time  The management plan was discussed in detail with the patient at bedside and all questions were answered  The prior to discharge, we provided both verbal and written instructions  We discussed with the patient the signs and symptoms for which to return to the emergency department  All questions were answered and patient was comfortable with the plan of care and discharged to home  Instructed the patient to follow up with the primary care provider and/or special as provided and their written instructions  The patient verbalized understanding of our discussion and plan of care, and agrees to return to the Emergency Department for concerns and progression of illness           Amount and/or Complexity of Data Reviewed  Clinical lab tests: ordered and reviewed  Tests in the radiology section of CPT®: ordered        Disposition  Final diagnoses:   Abnormal CT of the abdomen   Cystic mass of pancreas   Colon distention     Time reflects when diagnosis was documented in both MDM as applicable and the Disposition within this note     Time User Action Codes Description Comment    6/9/2021  7:10 PM [address] [R93 5] Abnormal CT of the abdomen     6/9/2021  7:10 PM Matthias Goodwin [K86 2] Cystic mass of pancreas     6/9/2021  7:10 PM Salomon Prater Add [M19 83] Colon distention       ED Disposition     ED Disposition Condition Date/Time Comment    Discharge Stable Wed Jun 9, 2021  7:09 PM [facility] discharge to home/self care  Follow-up Information     Follow up With Specialties Details Why Contact Info Additional [address]  V, DO Family Medicine Schedule an appointment as soon as possible for a visit in 1 week you will need follow up for abnormal finding on CT scan of pancreatic cyst; recommend repeat imaging 6 months [address]  [phone]       [facility] Emergency Department Emergency Medicine Go to  If symptoms worsen [facility] [id number]  [address] [facility] Emergency Department, [address] [place], [place], [address]          Discharge Medication List as of 6/9/2021  7:13 PM      START taking these medications    Details   docusate sodium (COLACE) 100 mg capsule Take 1 capsule (100 mg total) by mouth every 12 (twelve) hours, Starting Wed 6/9/2021, Normal      polyethylene glycol (GLYCOLAX) 17 GM/SCOOP powder Take 17 g by mouth daily for 7 days, Starting Wed 6/9/2021, Until Wed 6/16/2021, Normal         CONTINUE these medications which have NOT CHANGED    Details   divalproex sodium (DEPAKOTE ER) 500 mg 24 hr tablet Take 500 mg by mouth 2 (two) times a day, Historical Med      QUEtiapine (SEROquel) 100 mg tablet Take 1 tablet (100 mg total) by mouth daily at bedtime, Starting Wed 5/5/2021, Until Wed 6/9/2021, Normal      sertraline (ZOLOFT) 25 mg tablet Take 1 tablet (25 mg total) by mouth daily, Starting Thu 5/6/2021, Until Wed 6/9/2021, Normal           No discharge procedures on file      PDMP Review     None          ED Provider  Electronically Signed by           Javy Napier PA-C  06/12/21 7807

## 2021-06-10 LAB — BACTERIA UR CULT: NORMAL
